# Patient Record
Sex: FEMALE | Race: BLACK OR AFRICAN AMERICAN | NOT HISPANIC OR LATINO | Employment: FULL TIME | URBAN - METROPOLITAN AREA
[De-identification: names, ages, dates, MRNs, and addresses within clinical notes are randomized per-mention and may not be internally consistent; named-entity substitution may affect disease eponyms.]

---

## 2018-09-21 LAB — EXTERNAL URINE DRUG SCREEN: NORMAL

## 2018-09-22 LAB
EXTERNAL ABO GROUPING: (no result)
EXTERNAL ANTIBODY SCREEN: NORMAL
EXTERNAL HEMOGLOBIN: 13.6 G/DL
EXTERNAL HEPATITIS B SURFACE ANTIGEN: NEGATIVE
EXTERNAL PLATELET COUNT: 256 K/ΜL
EXTERNAL RH FACTOR: POSITIVE
EXTERNAL SYPHILIS RPR SCREEN: (no result)
HIV 1+2 AB+HIV1 P24 AG SERPL QL IA: NON REACTIVE
RUBV IGG SERPL IA-ACNC: (no result)

## 2018-11-28 LAB
EXTERNAL GLUCOSE TOLERANCE TEST FASTING: 89 MG/DL
EXTERNAL HEMOGLOBIN: 12.6 G/DL
EXTERNAL PLATELET COUNT: 229 K/ΜL
EXTERNAL PLATELET COUNT: 289 K/ΜL

## 2018-12-07 LAB
C TRACH RRNA SPEC DONR QL NAA+PROBE: NORMAL
N GONORRHOEA DNA SPEC QL NAA+PROBE: NORMAL

## 2019-01-23 ENCOUNTER — APPOINTMENT (OUTPATIENT)
Dept: LAB | Facility: HOSPITAL | Age: 29
End: 2019-01-23

## 2019-01-23 ENCOUNTER — INITIAL PRENATAL (OUTPATIENT)
Dept: OBSTETRICS AND GYNECOLOGY | Facility: CLINIC | Age: 29
End: 2019-01-23

## 2019-01-23 VITALS — BODY MASS INDEX: 36.4 KG/M2 | SYSTOLIC BLOOD PRESSURE: 132 MMHG | WEIGHT: 199 LBS | DIASTOLIC BLOOD PRESSURE: 72 MMHG

## 2019-01-23 DIAGNOSIS — Z34.82 PRENATAL CARE, SUBSEQUENT PREGNANCY IN SECOND TRIMESTER: ICD-10-CM

## 2019-01-23 DIAGNOSIS — O23.42 URINARY TRACT INFECTION IN MOTHER DURING SECOND TRIMESTER OF PREGNANCY: Primary | ICD-10-CM

## 2019-01-23 PROBLEM — Z34.80 PRENATAL CARE, SUBSEQUENT PREGNANCY: Status: ACTIVE | Noted: 2019-01-23

## 2019-01-23 PROBLEM — O23.40 UTI IN PREGNANCY: Status: ACTIVE | Noted: 2019-01-23

## 2019-01-23 PROCEDURE — 87086 URINE CULTURE/COLONY COUNT: CPT | Performed by: OBSTETRICS & GYNECOLOGY

## 2019-01-23 PROCEDURE — 87077 CULTURE AEROBIC IDENTIFY: CPT | Performed by: OBSTETRICS & GYNECOLOGY

## 2019-01-23 PROCEDURE — 87186 SC STD MICRODIL/AGAR DIL: CPT | Performed by: OBSTETRICS & GYNECOLOGY

## 2019-01-23 PROCEDURE — 0501F PRENATAL FLOW SHEET: CPT | Performed by: OBSTETRICS & GYNECOLOGY

## 2019-01-23 NOTE — PROGRESS NOTES
Subjective   Chief Complaint   Patient presents with   • Initial Prenatal Visit     Transfer of care from Dr Nieto at [facility]       Kym Mckenna is a 28 y.o. year old .  No LMP recorded. Patient is pregnant.  She presents to be seen to as a transfer of care from [facility]. She reports she transferred care because she thinks they mismanaged her UTI in December. She reports being in the hospital and receiving IV abx. No urinary symptoms today. Has been having intermittent cramping. Reports FM.     Last saw [facility] in   Reports she has had OB labs   Reports she had normal genetic screening    Social History    Tobacco Use      Smoking status: Never Smoker      The following portions of the patient's history were reviewed and updated as appropriate:vital signs, allergies, current medications, past medical history, past social history, past surgical history and problem list.    Objective   Lab Review   No data reviewed    Imaging   No data reviewed     Pelvic: c/th/hi    Assessment/Plan   ASSESSMENT  1. 28 y.o. year old  at 28w5d  2. Supervision of high risk pregnancy   3. UTI in pregnancy - s/p treatment     PLAN  1. The problem list for pregnancy was initiated today  2. Tests ordered today:  Orders Placed This Encounter   Procedures   • Urine Culture - Urine, Urine, Clean Catch   • Urine Drug Screen - Urine, Clean Catch     Please confirm all positive UDS   • Glucose, Post 50 Gm Glucola     Standing Status:   Future     Standing Expiration Date:   3/24/2019   • Hemoglobin & Hematocrit, Blood     Standing Status:   Future     Standing Expiration Date:   2020     3. Testing for GC / Chlamydia / trichomonas was recently done and will not need to be repeated  4. Genetic testing reviewed: reports was normal   5. Information reviewed: exercise in pregnancy, nutrition in pregnancy, weight gain in pregnancy, work and travel restrictions during pregnancy, list of OTC medications acceptable in pregnancy  and call coverage groups   6. The following data needs to be obtained to update her medical records: all prenatal records asap.    Total time spent today with Kym  was 50 minutes (level 4).  Off this time, > 50% was spent face-to-face time coordinating care, answering her questions and counseling regarding pathophysiology of her presenting problem along with plans for any diagnositc work-up and treatment.    Follow up: 1 week(s)  For glucola and labs and close follow up to make sure records make it.        This note was electronically signed.    Mine Hussein MD  January 23, 2019

## 2019-01-25 ENCOUNTER — DOCUMENTATION (OUTPATIENT)
Dept: OBSTETRICS AND GYNECOLOGY | Facility: CLINIC | Age: 29
End: 2019-01-25

## 2019-01-25 ENCOUNTER — TELEPHONE (OUTPATIENT)
Dept: OBSTETRICS AND GYNECOLOGY | Facility: CLINIC | Age: 29
End: 2019-01-25

## 2019-01-25 PROBLEM — Q21.0 VSD (VENTRICULAR SEPTAL DEFECT AND AORTIC ARCH HYPOPLASIA: Status: ACTIVE | Noted: 2019-01-25

## 2019-01-25 PROBLEM — Q25.42 VSD (VENTRICULAR SEPTAL DEFECT AND AORTIC ARCH HYPOPLASIA: Status: ACTIVE | Noted: 2019-01-25

## 2019-01-25 LAB — BACTERIA SPEC AEROBE CULT: ABNORMAL

## 2019-01-25 RX ORDER — NITROFURANTOIN 25; 75 MG/1; MG/1
100 CAPSULE ORAL 2 TIMES DAILY
Qty: 10 CAPSULE | Refills: 0 | Status: SHIPPED | OUTPATIENT
Start: 2019-01-25 | End: 2019-01-30

## 2019-01-25 RX ORDER — NITROFURANTOIN 25; 75 MG/1; MG/1
100 CAPSULE ORAL DAILY
Qty: 30 CAPSULE | Refills: 3 | Status: SHIPPED | OUTPATIENT
Start: 2019-01-25 | End: 2019-02-05 | Stop reason: SDUPTHER

## 2019-01-25 NOTE — TELEPHONE ENCOUNTER
Patient's urine test from first prenatal visit was positive for infection/UTI. Macrobid 100mg BID sent to pharmacy for treatment duration of 5 days. Given this is her second UTI she needs to be on prophylaxis the remainder of pregnancy. Additional rx for macrobid 100mg daily sent to pharmacy as well. Can be patient be notified? Thanks, Mine Hussein MD

## 2019-01-25 NOTE — TELEPHONE ENCOUNTER
Dr. Hussein patient 29w0d  [phone] called patient regarding her urine culture. Recording: We're sorry but your call cannot be completed at this time please hang up and try your call again later.

## 2019-01-29 ENCOUNTER — TELEPHONE (OUTPATIENT)
Dept: OBSTETRICS AND GYNECOLOGY | Facility: CLINIC | Age: 29
End: 2019-01-29

## 2019-02-01 ENCOUNTER — DOCUMENTATION (OUTPATIENT)
Dept: OBSTETRICS AND GYNECOLOGY | Facility: CLINIC | Age: 29
End: 2019-02-01

## 2019-02-05 ENCOUNTER — LAB (OUTPATIENT)
Dept: LAB | Facility: HOSPITAL | Age: 29
End: 2019-02-05

## 2019-02-05 ENCOUNTER — HOSPITAL ENCOUNTER (OUTPATIENT)
Dept: WOMENS IMAGING | Facility: HOSPITAL | Age: 29
Discharge: HOME | End: 2019-02-05
Admitting: OBSTETRICS & GYNECOLOGY

## 2019-02-05 ENCOUNTER — OFFICE VISIT (OUTPATIENT)
Dept: OBSTETRICS AND GYNECOLOGY | Facility: HOSPITAL | Age: 29
End: 2019-02-05

## 2019-02-05 ENCOUNTER — ROUTINE PRENATAL (OUTPATIENT)
Dept: OBSTETRICS AND GYNECOLOGY | Facility: CLINIC | Age: 29
End: 2019-02-05

## 2019-02-05 VITALS
WEIGHT: 201 LBS | BODY MASS INDEX: 36.99 KG/M2 | HEIGHT: 62 IN | DIASTOLIC BLOOD PRESSURE: 61 MMHG | SYSTOLIC BLOOD PRESSURE: 115 MMHG

## 2019-02-05 VITALS — BODY MASS INDEX: 36.58 KG/M2 | WEIGHT: 200 LBS | DIASTOLIC BLOOD PRESSURE: 80 MMHG | SYSTOLIC BLOOD PRESSURE: 128 MMHG

## 2019-02-05 DIAGNOSIS — Z34.82 PRENATAL CARE, SUBSEQUENT PREGNANCY IN SECOND TRIMESTER: ICD-10-CM

## 2019-02-05 DIAGNOSIS — O35.BXX0 ANOMALY OF HEART OF FETUS AFFECTING PREGNANCY, ANTEPARTUM, SINGLE OR UNSPECIFIED FETUS: Primary | ICD-10-CM

## 2019-02-05 DIAGNOSIS — Q25.42 VSD (VENTRICULAR SEPTAL DEFECT AND AORTIC ARCH HYPOPLASIA: ICD-10-CM

## 2019-02-05 DIAGNOSIS — Q21.0 VSD (VENTRICULAR SEPTAL DEFECT AND AORTIC ARCH HYPOPLASIA: ICD-10-CM

## 2019-02-05 DIAGNOSIS — Z34.90 PREGNANCY, UNSPECIFIED GESTATIONAL AGE: ICD-10-CM

## 2019-02-05 PROBLEM — Z87.410 HISTORY OF CERVICAL DYSPLASIA: Status: ACTIVE | Noted: 2019-02-05

## 2019-02-05 PROBLEM — Z30.09 STERILIZATION CONSULT: Status: ACTIVE | Noted: 2019-02-05

## 2019-02-05 LAB
GLUCOSE 1H P 100 G GLC PO SERPL-MCNC: 106 MG/DL (ref 65–140)
HCT VFR BLD AUTO: 35.8 % (ref 34.5–44)
HGB BLD-MCNC: 11.8 G/DL (ref 11.5–15.5)

## 2019-02-05 PROCEDURE — 0502F SUBSEQUENT PRENATAL CARE: CPT | Performed by: OBSTETRICS & GYNECOLOGY

## 2019-02-05 PROCEDURE — 76811 OB US DETAILED SNGL FETUS: CPT

## 2019-02-05 PROCEDURE — 82950 GLUCOSE TEST: CPT

## 2019-02-05 PROCEDURE — 85018 HEMOGLOBIN: CPT

## 2019-02-05 PROCEDURE — 76811 OB US DETAILED SNGL FETUS: CPT | Performed by: OBSTETRICS & GYNECOLOGY

## 2019-02-05 PROCEDURE — 36415 COLL VENOUS BLD VENIPUNCTURE: CPT

## 2019-02-05 PROCEDURE — 85014 HEMATOCRIT: CPT

## 2019-02-05 RX ORDER — NITROFURANTOIN 25; 75 MG/1; MG/1
100 CAPSULE ORAL DAILY
Qty: 30 CAPSULE | Refills: 3 | Status: SHIPPED | OUTPATIENT
Start: 2019-02-05 | End: 2019-02-26 | Stop reason: HOSPADM

## 2019-02-05 RX ORDER — PRENATAL WITH FERROUS FUM AND FOLIC ACID 3080; 920; 120; 400; 22; 1.84; 3; 20; 10; 1; 12; 200; 27; 25; 2 [IU]/1; [IU]/1; MG/1; [IU]/1; MG/1; MG/1; MG/1; MG/1; MG/1; MG/1; UG/1; MG/1; MG/1; MG/1; MG/1
1 TABLET ORAL DAILY
COMMUNITY
End: 2021-08-24

## 2019-02-05 NOTE — PROGRESS NOTES
Chief Complaint   Patient presents with   • Routine Prenatal Visit       HPI: Kym is a  currently at 30w4d who today reports the following:  Nausea - No; Vaginal bleeding -  No; Heartburn - No.    ROS:  GI: Constipation - No; Diarrhea - No    Neuro: Headache - No; Visual change - No      EXAM:  Vitals: See prenatal flowsheet   Abdomen: See prenatal flowsheet   Urine glucose/protein: See prenatal flowsheet   Pelvic: See prenatal flowsheet     Prenatal Labs  Lab Results   Component Value Date    HGB 12.6 2018    RUBELLAABIGG immune 2018    HEPBSAG Negative 2018    ABSCRN Normal 2018    SOD9UXC2 non reactive  2018    URINECX 50,000-60,000 CFU/mL Citrobacter koseri (A) 2019    CHLAMNAA neg 2018    NGONORRHON neg 2018       MDM:  Impression: 1. Supervision of high risk pregnancy  2. History of UTI this pregnancy x2 - on suppression  3. History of small fetal VSD with previous provider ultrasound   4. Desires permanent sterilization    Tests done today: 1. GCT  2. HgB   Topics discussed: 1. Continue with PNV's  2. Prenatal labs reviewed  3. Sign medicaid papers for tubal    Tests scheduled today for her next visit:   PDC scan for follow up of VSD

## 2019-02-05 NOTE — PROGRESS NOTES
Denies problems. Saw Dr. Hussein today, next visit 2/20/2019. Transferred care from Dr. Nieto to Dr. Hussein 1/23/2019.

## 2019-02-11 ENCOUNTER — TELEPHONE (OUTPATIENT)
Dept: OBSTETRICS AND GYNECOLOGY | Facility: CLINIC | Age: 29
End: 2019-02-11

## 2019-02-11 NOTE — TELEPHONE ENCOUNTER
If she wants a 39+ week elective IOL I would prefer the morning of Monday April 8th if possible because I am on call that evening.   Thanks  Mine Hussein MD

## 2019-02-11 NOTE — TELEPHONE ENCOUNTER
She has had a prior vaginal delivery so we would not be doing a c section. She will either have a tubal done prior to discharge or at 6 weeks laparoscopically as we discussed at her last visit. Plan is for vaginal delivery.     Mine Hussein MD

## 2019-02-20 ENCOUNTER — ROUTINE PRENATAL (OUTPATIENT)
Dept: OBSTETRICS AND GYNECOLOGY | Facility: CLINIC | Age: 29
End: 2019-02-20

## 2019-02-20 ENCOUNTER — APPOINTMENT (OUTPATIENT)
Dept: LAB | Facility: HOSPITAL | Age: 29
End: 2019-02-20

## 2019-02-20 VITALS — DIASTOLIC BLOOD PRESSURE: 62 MMHG | SYSTOLIC BLOOD PRESSURE: 114 MMHG | BODY MASS INDEX: 37.74 KG/M2 | WEIGHT: 203 LBS

## 2019-02-20 DIAGNOSIS — Z30.09 STERILIZATION CONSULT: ICD-10-CM

## 2019-02-20 DIAGNOSIS — O35.BXX0 ANOMALY OF HEART OF FETUS AFFECTING PREGNANCY, ANTEPARTUM, SINGLE OR UNSPECIFIED FETUS: ICD-10-CM

## 2019-02-20 DIAGNOSIS — Z34.83 PRENATAL CARE, SUBSEQUENT PREGNANCY IN THIRD TRIMESTER: ICD-10-CM

## 2019-02-20 DIAGNOSIS — O23.43 URINARY TRACT INFECTION IN MOTHER DURING THIRD TRIMESTER OF PREGNANCY: ICD-10-CM

## 2019-02-20 PROCEDURE — 87086 URINE CULTURE/COLONY COUNT: CPT | Performed by: OBSTETRICS & GYNECOLOGY

## 2019-02-20 PROCEDURE — 0502F SUBSEQUENT PRENATAL CARE: CPT | Performed by: OBSTETRICS & GYNECOLOGY

## 2019-02-20 NOTE — PROGRESS NOTES
Chief Complaint   Patient presents with   • Routine Prenatal Visit     ob visit, c/o's abd and pelvic pain and pressure       HPI: Kym is a  currently at 32w5d who today reports the following:  Contractions - YES - but less than 4/hour AND no associated change in vaginal discharge; Leaking - No; Vaginal bleeding -  No; Heartburn - No.  She has a lot of pressure and stinging in the vaginal area and feels pain with urination     ROS:  GI: Nausea - No ; Constipation - No; Diarrhea - No    Neuro: Headache - No ; Visual change - No      EXAM:  Vitals: See prenatal flowsheet   Abdomen: See prenatal flowsheet   Urine glucose/protein: See prenatal flowsheet   Pelvic: See prenatal flowsheet - ft to closed/th/hi, vertex     Lab Results   Component Value Date    HGB 11.8 2019    HCT 35.8 2019     2019    ABO A 2018    RH Positive 2018    ABSCRN Normal 2018       MDM:  Impression: 1. Supervision of high risk pregnancy   2. History of UTI this pregnancy x2 - on suppression with macrobid daily   3. History of small fetal VSD with previous provider ultrasound   4. Desires permanent sterilization    Tests done today: 1. Urine culture ISAAC   Topics discussed: 1. Continue with PNV's  2. Prenatal labs reviewed  3.  labor signs and symptoms   4. Recommended using pregnancy belt    Tests scheduled today for her next visit:   none

## 2019-02-22 LAB — BACTERIA SPEC AEROBE CULT: NORMAL

## 2019-02-26 ENCOUNTER — TELEPHONE (OUTPATIENT)
Dept: OBSTETRICS AND GYNECOLOGY | Facility: CLINIC | Age: 29
End: 2019-02-26

## 2019-02-26 ENCOUNTER — HOSPITAL ENCOUNTER (OUTPATIENT)
Facility: HOSPITAL | Age: 29
Setting detail: OBSERVATION
Discharge: HOME | End: 2019-02-26
Attending: OBSTETRICS & GYNECOLOGY | Admitting: OBSTETRICS & GYNECOLOGY

## 2019-02-26 VITALS
DIASTOLIC BLOOD PRESSURE: 68 MMHG | SYSTOLIC BLOOD PRESSURE: 111 MMHG | TEMPERATURE: 97.6 F | HEART RATE: 92 BPM | HEIGHT: 62 IN | BODY MASS INDEX: 37.36 KG/M2 | WEIGHT: 203 LBS

## 2019-02-26 PROBLEM — R51.9 HA (HEADACHE): Status: ACTIVE | Noted: 2019-02-26

## 2019-02-26 LAB
ALP SERPL-CCNC: 164 U/L (ref 25–100)
ALT SERPL W P-5'-P-CCNC: 9 U/L (ref 7–40)
AST SERPL-CCNC: 16 U/L (ref 0–33)
BACTERIA UR QL AUTO: ABNORMAL /HPF
BILIRUB SERPL-MCNC: 0.4 MG/DL (ref 0.3–1.2)
BILIRUB UR QL STRIP: NEGATIVE
CLARITY UR: ABNORMAL
COLOR UR: YELLOW
CREAT BLD-MCNC: 0.47 MG/DL (ref 0.6–1.3)
DEPRECATED RDW RBC AUTO: 44.1 FL (ref 37–54)
ERYTHROCYTE [DISTWIDTH] IN BLOOD BY AUTOMATED COUNT: 13.9 % (ref 11.3–14.5)
GLUCOSE BLDC GLUCOMTR-MCNC: 90 MG/DL (ref 70–130)
GLUCOSE UR STRIP-MCNC: NEGATIVE MG/DL
HCT VFR BLD AUTO: 36.8 % (ref 34.5–44)
HGB BLD-MCNC: 11.9 G/DL (ref 11.5–15.5)
HGB UR QL STRIP.AUTO: NEGATIVE
HYALINE CASTS UR QL AUTO: ABNORMAL /LPF
KETONES UR QL STRIP: ABNORMAL
LDH SERPL-CCNC: 151 U/L (ref 120–246)
LEUKOCYTE ESTERASE UR QL STRIP.AUTO: ABNORMAL
MCH RBC QN AUTO: 28.5 PG (ref 27–31)
MCHC RBC AUTO-ENTMCNC: 32.3 G/DL (ref 32–36)
MCV RBC AUTO: 88 FL (ref 80–99)
MUCOUS THREADS URNS QL MICRO: ABNORMAL /HPF
NITRITE UR QL STRIP: NEGATIVE
PH UR STRIP.AUTO: 6.5 [PH] (ref 5–8)
PLATELET # BLD AUTO: 163 10*3/MM3 (ref 150–450)
PMV BLD AUTO: 11.8 FL (ref 6–12)
PROT UR QL STRIP: NEGATIVE
RBC # BLD AUTO: 4.18 10*6/MM3 (ref 3.89–5.14)
RBC # UR: ABNORMAL /HPF
REF LAB TEST METHOD: ABNORMAL
SP GR UR STRIP: 1.01 (ref 1–1.03)
SQUAMOUS #/AREA URNS HPF: ABNORMAL /HPF
URATE SERPL-MCNC: 3.6 MG/DL (ref 3.1–7.8)
UROBILINOGEN UR QL STRIP: ABNORMAL
WBC NRBC COR # BLD: 7.96 10*3/MM3 (ref 3.5–10.8)
WBC UR QL AUTO: ABNORMAL /HPF

## 2019-02-26 PROCEDURE — G0378 HOSPITAL OBSERVATION PER HR: HCPCS

## 2019-02-26 PROCEDURE — 81001 URINALYSIS AUTO W/SCOPE: CPT | Performed by: OBSTETRICS & GYNECOLOGY

## 2019-02-26 PROCEDURE — 82962 GLUCOSE BLOOD TEST: CPT

## 2019-02-26 PROCEDURE — 25010000002 PROMETHAZINE PER 50 MG: Performed by: OBSTETRICS & GYNECOLOGY

## 2019-02-26 PROCEDURE — 59025 FETAL NON-STRESS TEST: CPT | Performed by: OBSTETRICS & GYNECOLOGY

## 2019-02-26 PROCEDURE — 96374 THER/PROPH/DIAG INJ IV PUSH: CPT

## 2019-02-26 PROCEDURE — 59025 FETAL NON-STRESS TEST: CPT

## 2019-02-26 PROCEDURE — 99219 PR INITIAL OBSERVATION CARE/DAY 50 MINUTES: CPT | Performed by: OBSTETRICS & GYNECOLOGY

## 2019-02-26 PROCEDURE — 84450 TRANSFERASE (AST) (SGOT): CPT | Performed by: OBSTETRICS & GYNECOLOGY

## 2019-02-26 PROCEDURE — 84075 ASSAY ALKALINE PHOSPHATASE: CPT | Performed by: OBSTETRICS & GYNECOLOGY

## 2019-02-26 PROCEDURE — 82247 BILIRUBIN TOTAL: CPT | Performed by: OBSTETRICS & GYNECOLOGY

## 2019-02-26 PROCEDURE — 85027 COMPLETE CBC AUTOMATED: CPT | Performed by: OBSTETRICS & GYNECOLOGY

## 2019-02-26 PROCEDURE — 84550 ASSAY OF BLOOD/URIC ACID: CPT | Performed by: OBSTETRICS & GYNECOLOGY

## 2019-02-26 PROCEDURE — 96361 HYDRATE IV INFUSION ADD-ON: CPT

## 2019-02-26 PROCEDURE — 84460 ALANINE AMINO (ALT) (SGPT): CPT | Performed by: OBSTETRICS & GYNECOLOGY

## 2019-02-26 PROCEDURE — 82565 ASSAY OF CREATININE: CPT | Performed by: OBSTETRICS & GYNECOLOGY

## 2019-02-26 PROCEDURE — 83615 LACTATE (LD) (LDH) ENZYME: CPT | Performed by: OBSTETRICS & GYNECOLOGY

## 2019-02-26 RX ORDER — OXYCODONE AND ACETAMINOPHEN 10; 325 MG/1; MG/1
1 TABLET ORAL ONCE AS NEEDED
Status: COMPLETED | OUTPATIENT
Start: 2019-02-26 | End: 2019-02-26

## 2019-02-26 RX ORDER — SODIUM CHLORIDE 0.9 % (FLUSH) 0.9 %
3 SYRINGE (ML) INJECTION EVERY 12 HOURS SCHEDULED
Status: DISCONTINUED | OUTPATIENT
Start: 2019-02-26 | End: 2019-02-26 | Stop reason: HOSPADM

## 2019-02-26 RX ORDER — SODIUM CHLORIDE, SODIUM LACTATE, POTASSIUM CHLORIDE, CALCIUM CHLORIDE 600; 310; 30; 20 MG/100ML; MG/100ML; MG/100ML; MG/100ML
1000 INJECTION, SOLUTION INTRAVENOUS CONTINUOUS
Status: DISCONTINUED | OUTPATIENT
Start: 2019-02-26 | End: 2019-02-26 | Stop reason: HOSPADM

## 2019-02-26 RX ORDER — PROMETHAZINE HYDROCHLORIDE 25 MG/ML
12.5 INJECTION, SOLUTION INTRAMUSCULAR; INTRAVENOUS EVERY 6 HOURS PRN
Status: DISCONTINUED | OUTPATIENT
Start: 2019-02-26 | End: 2019-02-26 | Stop reason: HOSPADM

## 2019-02-26 RX ORDER — SODIUM CHLORIDE 0.9 % (FLUSH) 0.9 %
1-10 SYRINGE (ML) INJECTION AS NEEDED
Status: DISCONTINUED | OUTPATIENT
Start: 2019-02-26 | End: 2019-02-26 | Stop reason: HOSPADM

## 2019-02-26 RX ADMIN — SODIUM CHLORIDE, POTASSIUM CHLORIDE, SODIUM LACTATE AND CALCIUM CHLORIDE 1000 ML/HR: 600; 310; 30; 20 INJECTION, SOLUTION INTRAVENOUS at 13:44

## 2019-02-26 RX ADMIN — OXYCODONE HYDROCHLORIDE AND ACETAMINOPHEN 1 TABLET: 10; 325 TABLET ORAL at 14:08

## 2019-02-26 RX ADMIN — PROMETHAZINE HYDROCHLORIDE 12.5 MG: 25 INJECTION INTRAMUSCULAR; INTRAVENOUS at 13:56

## 2019-02-26 NOTE — TELEPHONE ENCOUNTER
franky    Ob pt 33wks having extremely bad migaines for last few days. She has been taking tylenol but it's not helping. Could you call something stronger in for her?     ming in [facility].

## 2019-03-06 ENCOUNTER — ROUTINE PRENATAL (OUTPATIENT)
Dept: OBSTETRICS AND GYNECOLOGY | Facility: CLINIC | Age: 29
End: 2019-03-06

## 2019-03-06 VITALS — BODY MASS INDEX: 37.28 KG/M2 | WEIGHT: 203.8 LBS | SYSTOLIC BLOOD PRESSURE: 120 MMHG | DIASTOLIC BLOOD PRESSURE: 76 MMHG

## 2019-03-06 DIAGNOSIS — O09.93 HIGH-RISK PREGNANCY IN THIRD TRIMESTER: Primary | ICD-10-CM

## 2019-03-06 LAB — GP B STREP RRNA SPEC QL PROBE: NORMAL

## 2019-03-06 PROCEDURE — 0502F SUBSEQUENT PRENATAL CARE: CPT | Performed by: OBSTETRICS & GYNECOLOGY

## 2019-03-06 RX ORDER — NITROFURANTOIN 25; 75 MG/1; MG/1
100 CAPSULE ORAL 2 TIMES DAILY
COMMUNITY
End: 2019-03-19 | Stop reason: ALTCHOICE

## 2019-03-06 NOTE — PROGRESS NOTES
Chief Complaint   Patient presents with   • Routine Prenatal Visit     Garner Proctor contractions every hour       HPI: Kym is a  currently at 34w5d who today reports the following:  Contractions - YES - but less than 4/hour AND no associated change in vaginal discharge; Leaking - No; Vaginal bleeding -  No; Swelling of extremities - No.    ROS:  GI: Nausea - No; Constipation - No; Diarrhea - No    Neuro: Headache - No; Visual change - No      EXAM:  Vitals: See prenatal flowsheet   Abdomen: See prenatal flowsheet   Urine glucose/protein: See prenatal flowsheet   Pelvic: See prenatal flowsheet   MDM:   Impression: 1. Supervision of high risk pregnancy   2. History of UTI this pregnancy x2 - on suppression with macrobid daily. Will need to switch to keflex at next visit.   3. History of small fetal VSD with previous provider ultrasound - none seen on PDC scan   4. Desires permanent sterilization  5. No evidence of PTL today    Tests done today: 1. GBS testing   Topics discussed: 1. Continue with PNV's  2. Prenatal labs reviewed  3. labor signs and symptoms   Tests scheduled today for her next visit:   none. RV in 2 weeks

## 2019-03-11 ENCOUNTER — DOCUMENTATION (OUTPATIENT)
Dept: OBSTETRICS AND GYNECOLOGY | Facility: CLINIC | Age: 29
End: 2019-03-11

## 2019-03-19 ENCOUNTER — ROUTINE PRENATAL (OUTPATIENT)
Dept: OBSTETRICS AND GYNECOLOGY | Facility: CLINIC | Age: 29
End: 2019-03-19

## 2019-03-19 VITALS — BODY MASS INDEX: 37.24 KG/M2 | SYSTOLIC BLOOD PRESSURE: 126 MMHG | DIASTOLIC BLOOD PRESSURE: 70 MMHG | WEIGHT: 203.6 LBS

## 2019-03-19 DIAGNOSIS — Z34.83 PRENATAL CARE, SUBSEQUENT PREGNANCY IN THIRD TRIMESTER: Primary | ICD-10-CM

## 2019-03-19 PROCEDURE — 0502F SUBSEQUENT PRENATAL CARE: CPT | Performed by: OBSTETRICS & GYNECOLOGY

## 2019-03-19 RX ORDER — CEPHALEXIN 500 MG/1
500 CAPSULE ORAL DAILY
Qty: 30 CAPSULE | Refills: 0 | Status: SHIPPED | OUTPATIENT
Start: 2019-03-19 | End: 2019-04-03 | Stop reason: SDUPTHER

## 2019-03-19 NOTE — PROGRESS NOTES
Chief Complaint   Patient presents with   • Routine Prenatal Visit     pt states that she is still having agnes brannon contractions, about one every hour.       HPI: Kym is a  currently at 36w4d who today reports the following:  Contractions - YES - but less than 4/hour AND no associated change in vaginal discharge; Leaking - No; Vaginal bleeding -  No; Swelling of extremities - No.    ROS:  GI: Nausea - No; Constipation - No; Diarrhea - No    Neuro: Headache - No; Visual change - No      EXAM:  Vitals: See prenatal flowsheet   Abdomen: See prenatal flowsheet   Urine glucose/protein: See prenatal flowsheet   Pelvic: See prenatal flowsheet - FT, th, high   MDM:   Impression: 1. Supervision of high risk pregnancy   2. History of UTI this pregnancy x2 - on suppression with macrobid daily. Switched to kelfex today   3. History of small fetal VSD with previous provider ultrasound - none seen on PDC scan   4. Desires permanent sterilization   Tests done today: 1. none   Topics discussed: 1. Continue with PNV's  2. Prenatal labs reviewed  3. labor signs and symptoms   4. Reviewed negative GBS from 2 weeks ago.    Tests scheduled today for her next visit:   none. RV in one week

## 2019-03-20 ENCOUNTER — TELEPHONE (OUTPATIENT)
Dept: OBSTETRICS AND GYNECOLOGY | Facility: CLINIC | Age: 29
End: 2019-03-20

## 2019-03-20 NOTE — TELEPHONE ENCOUNTER
Letter made in patients chart if if can be faxed and patient notified.     Thanks,   Mine Hussein MD

## 2019-03-20 NOTE — TELEPHONE ENCOUNTER
I told her I am happy to do whenever now just as long as she knows it may affect her days after delivery. If this is okay with her work then let me know and we can draft a letter.     Mine Hussein MD

## 2019-03-20 NOTE — TELEPHONE ENCOUNTER
Dr Hussein    Pt calling and need note stating she will be off from March 25 until Leta 15, 2019.    Pt call back [phone]

## 2019-03-20 NOTE — TELEPHONE ENCOUNTER
Spoke with pt, she does not need [facility] papers only a letter from Dr Hussein stating she can be off from   March 25-Leta 15. 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

## 2019-03-27 ENCOUNTER — ROUTINE PRENATAL (OUTPATIENT)
Dept: OBSTETRICS AND GYNECOLOGY | Facility: CLINIC | Age: 29
End: 2019-03-27

## 2019-03-27 ENCOUNTER — HOSPITAL ENCOUNTER (OUTPATIENT)
Facility: HOSPITAL | Age: 29
Discharge: HOME | End: 2019-03-27
Attending: OBSTETRICS & GYNECOLOGY | Admitting: OBSTETRICS & GYNECOLOGY

## 2019-03-27 VITALS
SYSTOLIC BLOOD PRESSURE: 125 MMHG | HEART RATE: 102 BPM | WEIGHT: 205 LBS | BODY MASS INDEX: 37.73 KG/M2 | DIASTOLIC BLOOD PRESSURE: 65 MMHG | RESPIRATION RATE: 16 BRPM | HEIGHT: 62 IN | TEMPERATURE: 97.9 F

## 2019-03-27 VITALS — SYSTOLIC BLOOD PRESSURE: 122 MMHG | DIASTOLIC BLOOD PRESSURE: 68 MMHG | WEIGHT: 204.8 LBS | BODY MASS INDEX: 37.46 KG/M2

## 2019-03-27 DIAGNOSIS — Z34.83 PRENATAL CARE, SUBSEQUENT PREGNANCY IN THIRD TRIMESTER: Primary | ICD-10-CM

## 2019-03-27 LAB
ALBUMIN SERPL-MCNC: 3.21 G/DL (ref 3.2–4.8)
ALBUMIN/GLOB SERPL: 1.3 G/DL (ref 1.5–2.5)
ALP SERPL-CCNC: 212 U/L (ref 25–100)
ALT SERPL W P-5'-P-CCNC: 12 U/L (ref 7–40)
ANION GAP SERPL CALCULATED.3IONS-SCNC: 6 MMOL/L (ref 3–11)
AST SERPL-CCNC: 16 U/L (ref 0–33)
BACTERIA UR QL AUTO: NORMAL /HPF
BILIRUB SERPL-MCNC: 0.5 MG/DL (ref 0.3–1.2)
BILIRUB UR QL STRIP: NEGATIVE
BUN BLD-MCNC: <5 MG/DL (ref 9–23)
BUN/CREAT SERPL: ABNORMAL (ref 7–25)
CALCIUM SPEC-SCNC: 8.4 MG/DL (ref 8.7–10.4)
CHLORIDE SERPL-SCNC: 109 MMOL/L (ref 99–109)
CLARITY UR: ABNORMAL
CO2 SERPL-SCNC: 23 MMOL/L (ref 20–31)
COLOR UR: YELLOW
CREAT BLD-MCNC: 0.4 MG/DL (ref 0.6–1.3)
DEPRECATED RDW RBC AUTO: 43.1 FL (ref 37–54)
ERYTHROCYTE [DISTWIDTH] IN BLOOD BY AUTOMATED COUNT: 13.9 % (ref 11.3–14.5)
GFR SERPL CREATININE-BSD FRML MDRD: >150 ML/MIN/1.73
GLOBULIN UR ELPH-MCNC: 2.5 GM/DL
GLUCOSE BLD-MCNC: 72 MG/DL (ref 70–100)
GLUCOSE UR STRIP-MCNC: NEGATIVE MG/DL
HCT VFR BLD AUTO: 33.4 % (ref 34.5–44)
HGB BLD-MCNC: 11 G/DL (ref 11.5–15.5)
HGB UR QL STRIP.AUTO: NEGATIVE
HYALINE CASTS UR QL AUTO: NORMAL /LPF
KETONES UR QL STRIP: NEGATIVE
LEUKOCYTE ESTERASE UR QL STRIP.AUTO: NEGATIVE
MCH RBC QN AUTO: 28.3 PG (ref 27–31)
MCHC RBC AUTO-ENTMCNC: 32.9 G/DL (ref 32–36)
MCV RBC AUTO: 85.9 FL (ref 80–99)
NITRITE UR QL STRIP: NEGATIVE
PH UR STRIP.AUTO: 7 [PH] (ref 5–8)
PLATELET # BLD AUTO: 168 10*3/MM3 (ref 150–450)
PMV BLD AUTO: 10.9 FL (ref 6–12)
POTASSIUM BLD-SCNC: 3.3 MMOL/L (ref 3.5–5.5)
PROT SERPL-MCNC: 5.7 G/DL (ref 5.7–8.2)
PROT UR QL STRIP: NEGATIVE
RBC # BLD AUTO: 3.89 10*6/MM3 (ref 3.89–5.14)
RBC # UR: NORMAL /HPF
REF LAB TEST METHOD: NORMAL
SODIUM BLD-SCNC: 138 MMOL/L (ref 132–146)
SP GR UR STRIP: 1.01 (ref 1–1.03)
SQUAMOUS #/AREA URNS HPF: NORMAL /HPF
UROBILINOGEN UR QL STRIP: ABNORMAL
WBC NRBC COR # BLD: 8.62 10*3/MM3 (ref 3.5–10.8)
WBC UR QL AUTO: NORMAL /HPF

## 2019-03-27 PROCEDURE — 59025 FETAL NON-STRESS TEST: CPT

## 2019-03-27 PROCEDURE — 59025 FETAL NON-STRESS TEST: CPT | Performed by: OBSTETRICS & GYNECOLOGY

## 2019-03-27 PROCEDURE — G0463 HOSPITAL OUTPT CLINIC VISIT: HCPCS

## 2019-03-27 PROCEDURE — 99213 OFFICE O/P EST LOW 20 MIN: CPT | Performed by: OBSTETRICS & GYNECOLOGY

## 2019-03-27 PROCEDURE — 80053 COMPREHEN METABOLIC PANEL: CPT | Performed by: OBSTETRICS & GYNECOLOGY

## 2019-03-27 PROCEDURE — 81001 URINALYSIS AUTO W/SCOPE: CPT | Performed by: OBSTETRICS & GYNECOLOGY

## 2019-03-27 PROCEDURE — 0502F SUBSEQUENT PRENATAL CARE: CPT | Performed by: OBSTETRICS & GYNECOLOGY

## 2019-03-27 PROCEDURE — 85027 COMPLETE CBC AUTOMATED: CPT | Performed by: OBSTETRICS & GYNECOLOGY

## 2019-03-27 RX ORDER — SODIUM CHLORIDE, SODIUM LACTATE, POTASSIUM CHLORIDE, CALCIUM CHLORIDE 600; 310; 30; 20 MG/100ML; MG/100ML; MG/100ML; MG/100ML
1000 INJECTION, SOLUTION INTRAVENOUS ONCE
Status: COMPLETED | OUTPATIENT
Start: 2019-03-27 | End: 2019-03-27

## 2019-03-27 RX ADMIN — SODIUM CHLORIDE, POTASSIUM CHLORIDE, SODIUM LACTATE AND CALCIUM CHLORIDE 1000 ML: 600; 310; 30; 20 INJECTION, SOLUTION INTRAVENOUS at 13:26

## 2019-03-27 NOTE — H&P
[facility]  Obstetric History and Physical    Chief Complaint   Patient presents with   • Non-stress Test       Subjective     Patient is a 28 y.o. female  currently at 37w5d, who presents for nonstress test secondary to prolonged fetal tachycardia noted during prenatal exam at Dr. Hussein's office.  Patient has reported normal fetal movement.  Additionally, the patient has had some recent nausea and vomiting.  She has had a history of prior urinary tract infections and has also been on prolonged suppressive antibiotic therapy.    Her prenatal care is as noted above. Her previous obstetric/gynecological history is noncontributory.    The following portions of the patients history were reviewed and updated as appropriate: current medications, allergies, past medical history, past surgical history, past family history, past social history and problem list .        Prenatal Information:   Maternal Prenatal Labs  Blood Type No results found for: ABO   Rh Status No results found for: RH   Antibody Screen No results found for: ABSCRN   Gonnorhea No results found for: GCCX   Chlamydia No results found for: CLAMYDCU   RPR No results found for: RPR   Syphilis Antibody No results found for: SYPHILIS   Rubella No results found for: RUBELLAIGGIN   Hepatitis B Surface Antigen No results found for: HEPBSAG   HIV-1 Antibody No results found for: LABHIV1   Hepatitis C Antibody No results found for: HEPCAB   Rapid Urin Drug Screen No results found for: AMPMETHU, BARBITSCNUR, LABBENZSCN, LABMETHSCN, LABOPIASCN, THCURSCR, COCAINEUR, AMPHETSCREEN, PROPOXSCN, BUPRENORSCNU, METAMPSCNUR, OXYCODONESCN, TRICYCLICSCN   Group B Strep Culture No results found for: GBSANTIGEN           External Prenatal Results     Pregnancy Outside Results - Transcribed From Office Records - See Scanned Records For Details     Test Value Date Time    Hgb 11.0 g/dL 19 1352    Hct 33.4 % 19 1352    ABO A  18     Rh Positive  18      Antibody Screen Normal  18     Glucose Fasting GTT       Glucose Tolerance Test 1 hour       Glucose Tolerance Test 3 hour       Gonorrhea (discrete) neg  18     Chlamydia (discrete) neg  18     RPR Non-Reactive  18     VDRL       Syphilis Antibody       Rubella immune  18     HBsAg Negative  18     Herpes Simplex Virus PCR       Herpes Simplex VIrus Culture       HIV non reactive   18     Hep C RNA Quant PCR       Hep C Antibody       AFP       Group B Strep neg  19     GBS Susceptibility to Clindamycin       GBS Susceptibility to Erythromycin       Fetal Fibronectin       Genetic Testing, Maternal Blood             Drug Screening     Test Value Date Time    Urine Drug Screen       Amphetamine Screen       Barbiturate Screen       Benzodiazepine Screen       Methadone Screen       Phencyclidine Screen       Opiates Screen       THC Screen       Cocaine Screen       Propoxyphene Screen       Buprenorphine Screen       Methamphetamine Screen       Oxycodone Screen       Tricyclic Antidepressants Screen                     Past OB History:       Obstetric History       T1      L1     SAB1   TAB2   Ectopic0   Molar0   Multiple0   Live Births1       # Outcome Date GA Lbr Mikel/2nd Weight Sex Delivery Anes PTL Lv   5 Current            4 TAB 2009 8w0d    TAB      3 TAB 2009 8w0d    TAB      2 SAB 06 8w0d    SAB      1 Term 05 39w3d  3147 g (6 lb 15 oz) M Vag-Spont EPI N ABIOLA      Complications: Status post induction of labor      Obstetric Comments   G1: Cardale, No GDM/HTN   FOB #1: Pregnancy #1; #3   FOB #2: Pregnancy #2       Past Medical History:  Past Medical History:   Diagnosis Date   • Abnormal Pap smear of cervix 2018    colpo WNL   • Ovarian cyst       Past Surgical History Past Surgical History:   Procedure Laterality Date   • BREAST BIOPSY Left     WNL   • COLPOSCOPY  2018    no biopsies   • COLPOSCOPY  2018    WNL   •  INDUCED   2009    EAB   • INDUCED   2009    EAB      Family History: Family History   Problem Relation Age of Onset   • Diabetes Father    • Hypertension Father    • Hypertension Mother    • Breast cancer Neg Hx    • Ovarian cancer Neg Hx    • Colon cancer Neg Hx       Social History:  reports that she has never smoked. She has never used smokeless tobacco.   reports that she drinks alcohol.   reports that she does not use drugs.   Allergies: Patient has no known allergies.  Current Medications:          No current facility-administered medications on file prior to encounter.      Current Outpatient Medications on File Prior to Encounter   Medication Sig Dispense Refill   • cephalexin (KEFLEX) 500 MG capsule Take 1 capsule by mouth Daily for 30 days. 30 capsule 0   • Prenatal Vit-Fe Fumarate-FA (PRENATAL -) 27-1 MG tablet tablet Take 1 tablet by mouth Daily.         General ROS: Pertinent items are noted in HPI, all other systems reviewed and negative    Objective       Vital Signs Range for the last 24 hours  Temperature: Temp:  [97.9 °F (36.6 °C)-98.2 °F (36.8 °C)] 97.9 °F (36.6 °C)   Temp Source: Temp src: Axillary   BP: BP: (114-125)/(64-68) 125/65   Pulse: Heart Rate:  [] 102   Respirations: Resp:  [16] 16   SPO2:     O2 Amount (l/min):     O2 Devices     Weight: Weight:  [92.9 kg (204 lb 12.8 oz)-93 kg (205 lb)] 93 kg (205 lb)     Physical Examination: General appearance - alert, well appearing, and in no distress, oriented to person, place, and time and overweight  Mental status - alert, oriented to person, place, and time, normal mood, behavior, speech, dress, motor activity, and thought processes  Eyes - pupils equal and reactive, extraocular eye movements intact, sclera anicteric  Mouth - mucous membranes moist, pharynx normal without lesions and dental hygiene good  Neck - supple, no significant adenopathy, thyroid exam: thyroid is normal in size without nodules or  tenderness  Chest - clear to auscultation, no wheezes, rales or rhonchi, symmetric air entry  Heart - normal rate, regular rhythm, normal S1, S2, no murmurs, rubs, clicks or gallops  Abdomen-soft, nontender, nondistended, no masses or organomegaly   Abdomen, Non-Tender  Neurological - alert, oriented, normal speech, no focal findings or movement disorder noted, DTR's normal and symmetric  Extremities - no pedal edema noted    Fetal Heart Rate Assessment  Indication: Fetal tachycardia   Start Time: 1300                end Time: 1740   NST Results: NST reactive.  Baseline fetal heart rate 125-130 bpm.  Average variability.  Multiple 15 x 15 accelerations noted.  No decelerations.  Occasional intermittent contraction noted.        Laboratory Results:   Lab Results   Component Value Date    ALKPHOS 212 (H) 2019    ALT 12 2019    AST 16 2019    CREATININE 0.40 (L) 2019    BILITOT 0.5 2019     2019    URICACID 3.6 2019       WBC   Date Value Ref Range Status   2019 8.62 3.50 - 10.80 10*3/mm3 Final     RBC   Date Value Ref Range Status   2019 3.89 3.89 - 5.14 10*6/mm3 Final     Hemoglobin   Date Value Ref Range Status   2019 11.0 (L) 11.5 - 15.5 g/dL Final     Hematocrit   Date Value Ref Range Status   2019 33.4 (L) 34.5 - 44.0 % Final     MCV   Date Value Ref Range Status   2019 85.9 80.0 - 99.0 fL Final     MCH   Date Value Ref Range Status   2019 28.3 27.0 - 31.0 pg Final     MCHC   Date Value Ref Range Status   2019 32.9 32.0 - 36.0 g/dL Final     RDW   Date Value Ref Range Status   2019 13.9 11.3 - 14.5 % Final     RDW-SD   Date Value Ref Range Status   2019 43.1 37.0 - 54.0 fl Final     MPV   Date Value Ref Range Status   2019 10.9 6.0 - 12.0 fL Final     Platelets   Date Value Ref Range Status   2019 168 150 - 450 10*3/mm3 Final             Brief Urine Lab Results  (Last result in the past 365  "days)      Color   Clarity   Blood   Leuk Est   Nitrite   Protein   CREAT   Urine HCG        03/27/19 1324 Yellow Turbid Negative Negative Negative Negative                 Radiology Review: No new studies  Other Studies: As noted above.  Urinalysis is entirely normal.    Assessment/Plan       * No active hospital problems. *        Assessment:  1. Fetal tachycardia in the office.  Fetal well-being established with reactive NST.  2. Patient complaining of URI symptoms.  Suspect viral URI.  Symptomatic relief measures reviewed.    Plan:  1. Discharge to home.  2. Keep regularly scheduled appointment with Dr. Hussein.     Total time spent today with Kym  was 15 minutes (level 2).  Off this time, > 50% was spent face-to-face time coordinating care, answering her questions and counseling regarding pathophysiology of her presenting problem along with plans for any diagnostic work-up and treatment.        Fernando Loco \"Cherelle\" KOLTON Grijalva MD  3/27/2019  5:40 PM    "

## 2019-03-27 NOTE — PROGRESS NOTES
Chief Complaint   Patient presents with   • Routine Prenatal Visit     pt states no c/o       HPI: Kym is a  currently at 37w5d who today reports the following:  Contractions - YES - but less than 4/hour AND no associated change in vaginal discharge; Leaking - No; Vaginal bleeding -  No; Swelling of extremities - No.  Reports having issues with nausea and head cold    ROS:  GI: Nausea - YES; Constipation - No; Diarrhea - No    Neuro: Headache - No; Visual change - No      EXAM:  Vitals: See prenatal flowsheet   Abdomen: See prenatal flowsheet   Urine glucose/protein: See prenatal flowsheet   Pelvic: See prenatal flowsheet - //hi, cephalic    MDM:   Impression: 1. Supervision of high risk pregnancy   1. History of UTI this pregnancy x2 - on suppression with keflex daily   2. History of small fetal VSD with previous provider ultrasound - none seen on PDC scan   3. Desires permanent sterilization  4. Suspect viral URI with dehdyration  5. Fetal tachycardia  6. Size > dates   Tests done today: 1. none   Topics discussed: 1. Continue with PNV's  2. Prenatal labs reviewed  3. to labor hu for further fetal monitoring, IVFs, and UA    Tests scheduled today for her next visit:   U/S for EFW

## 2019-03-30 ENCOUNTER — HOSPITAL ENCOUNTER (OUTPATIENT)
Facility: HOSPITAL | Age: 29
Setting detail: OBSERVATION
Discharge: HOME | End: 2019-03-30
Attending: OBSTETRICS & GYNECOLOGY | Admitting: OBSTETRICS & GYNECOLOGY

## 2019-03-30 VITALS
DIASTOLIC BLOOD PRESSURE: 64 MMHG | RESPIRATION RATE: 16 BRPM | TEMPERATURE: 97.6 F | WEIGHT: 205 LBS | SYSTOLIC BLOOD PRESSURE: 110 MMHG | HEART RATE: 82 BPM | HEIGHT: 62 IN | BODY MASS INDEX: 37.73 KG/M2

## 2019-03-30 PROBLEM — Z37.9 NORMAL LABOR: Status: ACTIVE | Noted: 2019-03-30

## 2019-03-30 LAB
ABO GROUP BLD: NORMAL
BLD GP AB SCN SERPL QL: NEGATIVE
DEPRECATED RDW RBC AUTO: 44.1 FL (ref 37–54)
ERYTHROCYTE [DISTWIDTH] IN BLOOD BY AUTOMATED COUNT: 14.2 % (ref 11.3–14.5)
HCT VFR BLD AUTO: 35.9 % (ref 34.5–44)
HGB BLD-MCNC: 11.6 G/DL (ref 11.5–15.5)
MCH RBC QN AUTO: 27.9 PG (ref 27–31)
MCHC RBC AUTO-ENTMCNC: 32.3 G/DL (ref 32–36)
MCV RBC AUTO: 86.3 FL (ref 80–99)
PLATELET # BLD AUTO: 199 10*3/MM3 (ref 150–450)
PMV BLD AUTO: 11.3 FL (ref 6–12)
RBC # BLD AUTO: 4.16 10*6/MM3 (ref 3.89–5.14)
RH BLD: POSITIVE
T&S EXPIRATION DATE: NORMAL
WBC NRBC COR # BLD: 9.97 10*3/MM3 (ref 3.5–10.8)

## 2019-03-30 PROCEDURE — 59025 FETAL NON-STRESS TEST: CPT | Performed by: OBSTETRICS & GYNECOLOGY

## 2019-03-30 PROCEDURE — 85027 COMPLETE CBC AUTOMATED: CPT | Performed by: OBSTETRICS & GYNECOLOGY

## 2019-03-30 PROCEDURE — 86900 BLOOD TYPING SEROLOGIC ABO: CPT | Performed by: OBSTETRICS & GYNECOLOGY

## 2019-03-30 PROCEDURE — 99218 PR INITIAL OBSERVATION CARE/DAY 30 MINUTES: CPT | Performed by: OBSTETRICS & GYNECOLOGY

## 2019-03-30 PROCEDURE — 86901 BLOOD TYPING SEROLOGIC RH(D): CPT | Performed by: OBSTETRICS & GYNECOLOGY

## 2019-03-30 PROCEDURE — 59025 FETAL NON-STRESS TEST: CPT

## 2019-03-30 PROCEDURE — G0378 HOSPITAL OBSERVATION PER HR: HCPCS

## 2019-03-30 PROCEDURE — 86850 RBC ANTIBODY SCREEN: CPT | Performed by: OBSTETRICS & GYNECOLOGY

## 2019-03-30 RX ORDER — ACETAMINOPHEN 325 MG/1
650 TABLET ORAL EVERY 4 HOURS PRN
Status: DISCONTINUED | OUTPATIENT
Start: 2019-03-30 | End: 2019-03-30 | Stop reason: HOSPADM

## 2019-03-30 RX ORDER — CARBOPROST TROMETHAMINE 250 UG/ML
250 INJECTION, SOLUTION INTRAMUSCULAR AS NEEDED
Status: CANCELLED | OUTPATIENT
Start: 2019-03-30

## 2019-03-30 RX ORDER — OXYTOCIN-SODIUM CHLORIDE 0.9% IV SOLN 30 UNIT/500ML 30-0.9/5 UT/ML-%
650 SOLUTION INTRAVENOUS ONCE
Status: CANCELLED | OUTPATIENT
Start: 2019-03-30 | End: 2019-03-30

## 2019-03-30 RX ORDER — ONDANSETRON 4 MG/1
4 TABLET, FILM COATED ORAL EVERY 6 HOURS PRN
Status: DISCONTINUED | OUTPATIENT
Start: 2019-03-30 | End: 2019-03-30 | Stop reason: HOSPADM

## 2019-03-30 RX ORDER — MISOPROSTOL 200 UG/1
800 TABLET ORAL AS NEEDED
Status: CANCELLED | OUTPATIENT
Start: 2019-03-30

## 2019-03-30 RX ORDER — SODIUM CHLORIDE 0.9 % (FLUSH) 0.9 %
3 SYRINGE (ML) INJECTION EVERY 12 HOURS SCHEDULED
Status: DISCONTINUED | OUTPATIENT
Start: 2019-03-30 | End: 2019-03-30 | Stop reason: HOSPADM

## 2019-03-30 RX ORDER — BUTORPHANOL TARTRATE 1 MG/ML
1 INJECTION, SOLUTION INTRAMUSCULAR; INTRAVENOUS
Status: DISCONTINUED | OUTPATIENT
Start: 2019-03-30 | End: 2019-03-30 | Stop reason: HOSPADM

## 2019-03-30 RX ORDER — LIDOCAINE HYDROCHLORIDE 10 MG/ML
5 INJECTION, SOLUTION EPIDURAL; INFILTRATION; INTRACAUDAL; PERINEURAL AS NEEDED
Status: DISCONTINUED | OUTPATIENT
Start: 2019-03-30 | End: 2019-03-30 | Stop reason: HOSPADM

## 2019-03-30 RX ORDER — METHYLERGONOVINE MALEATE 0.2 MG/ML
200 INJECTION INTRAVENOUS ONCE AS NEEDED
Status: CANCELLED | OUTPATIENT
Start: 2019-03-30

## 2019-03-30 RX ORDER — ONDANSETRON 2 MG/ML
4 INJECTION INTRAMUSCULAR; INTRAVENOUS EVERY 6 HOURS PRN
Status: DISCONTINUED | OUTPATIENT
Start: 2019-03-30 | End: 2019-03-30 | Stop reason: HOSPADM

## 2019-03-30 RX ORDER — OXYTOCIN-SODIUM CHLORIDE 0.9% IV SOLN 30 UNIT/500ML 30-0.9/5 UT/ML-%
85 SOLUTION INTRAVENOUS ONCE
Status: CANCELLED | OUTPATIENT
Start: 2019-03-30 | End: 2019-03-30

## 2019-03-30 RX ORDER — MAGNESIUM CARB/ALUMINUM HYDROX 105-160MG
30 TABLET,CHEWABLE ORAL ONCE
Status: DISCONTINUED | OUTPATIENT
Start: 2019-03-30 | End: 2019-03-30 | Stop reason: HOSPADM

## 2019-03-30 RX ORDER — SODIUM CHLORIDE 0.9 % (FLUSH) 0.9 %
3-10 SYRINGE (ML) INJECTION AS NEEDED
Status: DISCONTINUED | OUTPATIENT
Start: 2019-03-30 | End: 2019-03-30 | Stop reason: HOSPADM

## 2019-03-30 RX ORDER — SODIUM CHLORIDE, SODIUM LACTATE, POTASSIUM CHLORIDE, CALCIUM CHLORIDE 600; 310; 30; 20 MG/100ML; MG/100ML; MG/100ML; MG/100ML
125 INJECTION, SOLUTION INTRAVENOUS CONTINUOUS
Status: DISCONTINUED | OUTPATIENT
Start: 2019-03-30 | End: 2019-03-30 | Stop reason: HOSPADM

## 2019-04-03 ENCOUNTER — ROUTINE PRENATAL (OUTPATIENT)
Dept: OBSTETRICS AND GYNECOLOGY | Facility: CLINIC | Age: 29
End: 2019-04-03

## 2019-04-03 VITALS — SYSTOLIC BLOOD PRESSURE: 116 MMHG | DIASTOLIC BLOOD PRESSURE: 62 MMHG | BODY MASS INDEX: 38.01 KG/M2 | WEIGHT: 207.8 LBS

## 2019-04-03 DIAGNOSIS — R30.0 DYSURIA: ICD-10-CM

## 2019-04-03 DIAGNOSIS — Z34.83 PRENATAL CARE, SUBSEQUENT PREGNANCY IN THIRD TRIMESTER: Primary | ICD-10-CM

## 2019-04-03 LAB
BACTERIA UR QL AUTO: ABNORMAL /HPF
BILIRUB UR QL STRIP: NEGATIVE
CLARITY UR: ABNORMAL
COLOR UR: YELLOW
GLUCOSE UR STRIP-MCNC: NEGATIVE MG/DL
HGB UR QL STRIP.AUTO: NEGATIVE
HYALINE CASTS UR QL AUTO: ABNORMAL /LPF
KETONES UR QL STRIP: NEGATIVE
LEUKOCYTE ESTERASE UR QL STRIP.AUTO: ABNORMAL
NITRITE UR QL STRIP: NEGATIVE
PH UR STRIP.AUTO: 7.5 [PH] (ref 5–8)
PROT UR QL STRIP: NEGATIVE
RBC # UR: ABNORMAL /HPF
REF LAB TEST METHOD: ABNORMAL
SP GR UR STRIP: 1.01 (ref 1–1.03)
SQUAMOUS #/AREA URNS HPF: ABNORMAL /HPF
UROBILINOGEN UR QL STRIP: ABNORMAL
WBC UR QL AUTO: ABNORMAL /HPF

## 2019-04-03 PROCEDURE — 59426 ANTEPARTUM CARE ONLY: CPT | Performed by: OBSTETRICS & GYNECOLOGY

## 2019-04-03 PROCEDURE — 87077 CULTURE AEROBIC IDENTIFY: CPT | Performed by: OBSTETRICS & GYNECOLOGY

## 2019-04-03 PROCEDURE — 87186 SC STD MICRODIL/AGAR DIL: CPT | Performed by: OBSTETRICS & GYNECOLOGY

## 2019-04-03 PROCEDURE — 87086 URINE CULTURE/COLONY COUNT: CPT | Performed by: OBSTETRICS & GYNECOLOGY

## 2019-04-03 PROCEDURE — 81001 URINALYSIS AUTO W/SCOPE: CPT | Performed by: OBSTETRICS & GYNECOLOGY

## 2019-04-03 RX ORDER — CEPHALEXIN 500 MG/1
500 CAPSULE ORAL DAILY
Qty: 6 CAPSULE | Refills: 0 | Status: SHIPPED | OUTPATIENT
Start: 2019-04-03 | End: 2019-04-10 | Stop reason: HOSPADM

## 2019-04-03 NOTE — PROGRESS NOTES
Chief Complaint   Patient presents with   • Routine Prenatal Visit     US here today. pt states no c/o       HPI: Kym is a  currently at 38w5d who today reports the following:  Contractions - No; Leaking - No; Vaginal bleeding -  No; Swelling of extremities - No.  Reports continued cramping.   She stopped taking the keflex suppression because she reports it caused constipation and hemorrhoids     ROS:  GI: Nausea - No; Constipation - No; Diarrhea - No    Neuro: Headache - No; Visual change - No      EXAM:  Vitals: See prenatal flowsheet   Abdomen: See prenatal flowsheet   Urine glucose/protein: See prenatal flowsheet   Pelvic: See prenatal flowsheet   MDM:   Impression: 1. Supervision of high risk pregnancy   1. History of UTI this pregnancy x2 - on suppression with keflex daily - refill ordered  2. History of small fetal VSD with previous provider ultrasound - none seen on PDC scan   3. Desires permanent sterilization   Tests done today: 1. U/S for EFW - 3049 grams (27%), cephalic, posterior placenta, normal JOHN   2. UA with reflex to culture if indicated    Topics discussed: 1. Continue with PNV's  2. Prenatal labs reviewed  3. labor signs and symptoms   4. Reviewed importance of taken keflex suppression - reviewed that this medication does not cause constipation and hemorrhoids    Tests scheduled today for her next visit:   none

## 2019-04-05 ENCOUNTER — TELEPHONE (OUTPATIENT)
Dept: OBSTETRICS AND GYNECOLOGY | Facility: CLINIC | Age: 29
End: 2019-04-05

## 2019-04-05 RX ORDER — CEPHALEXIN 500 MG/1
500 CAPSULE ORAL 4 TIMES DAILY
Qty: 20 CAPSULE | Refills: 0 | Status: SHIPPED | OUTPATIENT
Start: 2019-04-05 | End: 2019-04-10 | Stop reason: HOSPADM

## 2019-04-05 NOTE — TELEPHONE ENCOUNTER
UA from visit this week concerning for UTI. Keflex sent to pharmacy for patient to take 4 times daily for 5 days instead of her suppression. Thanks,   Mine Hussein MD

## 2019-04-06 LAB
BACTERIA SPEC AEROBE CULT: ABNORMAL
BACTERIA SPEC AEROBE CULT: ABNORMAL

## 2019-04-06 NOTE — H&P (VIEW-ONLY)
Usman  Kym Mckenna  : 1990  MRN: [id number]  CSN: [id number]    History and Physical    Subjective   Kym Mckenna is a 28 y.o. year old  with an Estimated Date of Delivery: 19 presenting for induction of labor for favorable cervix at term.    Risks of labor induction including prolongation of labor, increased risks for both  section and operative vaginal birth have been discussed at length.     Prenatal care has been with  Dr. Hussein.  It has been complicated by history of frequent UTIs are suppression and currently being treated. .    Obstetric History       T1      L1     SAB1   TAB2   Ectopic0   Molar0   Multiple0   Live Births1       # Outcome Date GA Lbr Mikel/2nd Weight Sex Delivery Anes PTL Lv   5 Current            4 TAB 2009 8w0d    TAB      3 TAB 2009 8w0d    TAB      2 SAB 06 8w0d    SAB      1 Term 05 39w3d  3147 g (6 lb 15 oz) M Vag-Spont EPI N ABIOLA      Complications: Status post induction of labor      Obstetric Comments   G1: Cardale, No GDM/HTN   FOB #1: Pregnancy #1; #3   FOB #2: Pregnancy #2     Past Medical History:   Diagnosis Date   • Abnormal Pap smear of cervix 2018    colpo WNL   • Ovarian cyst      Past Surgical History:   Procedure Laterality Date   • BREAST BIOPSY Left     WNL   • COLPOSCOPY  2018    no biopsies   • COLPOSCOPY  2018    WNL   • INDUCED   2009    EAB   • INDUCED   2009    EAB       Current Outpatient Medications:   •  cephalexin (KEFLEX) 500 MG capsule, Take 1 capsule by mouth Daily for 6 days. For UTI suppression in pregnancy, Disp: 6 capsule, Rfl: 0  •  cephalexin (KEFLEX) 500 MG capsule, Take 1 capsule by mouth 4 (Four) Times a Day for 5 days., Disp: 20 capsule, Rfl: 0  •  Prenatal Vit-Fe Fumarate-FA (PRENATAL ) 27-1 MG tablet tablet, Take 1 tablet by mouth Daily., Disp: , Rfl:     No Known Allergies  Social History    Tobacco Use      Smoking  status: Never Smoker      Smokeless tobacco: Never Used    Review of Systems   Constitutional: Negative for chills and fever.   HENT: Negative for congestion and sore throat.    Respiratory: Negative for shortness of breath and wheezing.    Cardiovascular: Negative for chest pain and palpitations.   Gastrointestinal: Negative for abdominal pain, nausea and vomiting.   Genitourinary: Negative for frequency, vaginal bleeding and vaginal pain.   Musculoskeletal: Negative for back pain and myalgias.   Neurological: Negative for dizziness, light-headedness and headaches.   Psychiatric/Behavioral: Negative for confusion. The patient is not nervous/anxious.          Objective   There were no vitals taken for this visit.    General: well developed; well nourished  no acute distress   Abdomen: soft, non-tender; no masses  no umbilical or inguinal hernias are present  no hepato-splenomegaly   Cervix: At last prenatal visit - 3 cm / 70 % / -2 / soft / middle   Presentation: At last prenatal visit - cephalic     Prenatal Labs  Lab Results   Component Value Date    HGB 11.6 03/30/2019    RUBELLAABIGG immune 09/22/2018    HEPBSAG Negative 09/22/2018    ABSCRN Negative 03/30/2019    GUQ6KGX3 non reactive  09/22/2018     02/05/2019    GGTFASTING 89 11/28/2018    STREPGPB neg 03/06/2019    URINECX 20,000-30,000 CFU/mL Escherichia coli (A) 04/03/2019    URINECX 20,000-30,000 CFU/mL Citrobacter koseri (A) 04/03/2019    CHLAMNAA neg 12/07/2018    NGONORRHON neg 12/07/2018       Current Labs Reviewed   No data reviewed       Assessment   1. IUP with an Estimated Date of Delivery: 4/12/19  2. Induction of labor because of favorable cervix at term  3. Group B strep status: negative  4. History of frequent UTIs currently being treated  5. Desires permanent sterilization      Plan   1. Pitocin induction  2. Amniotomy with descent of presenting part  3. OK for epidural at patient request     Mine Hussein MD

## 2019-04-08 ENCOUNTER — ANESTHESIA EVENT (OUTPATIENT)
Dept: LABOR AND DELIVERY | Facility: HOSPITAL | Age: 29
End: 2019-04-08

## 2019-04-08 ENCOUNTER — ANESTHESIA (OUTPATIENT)
Dept: LABOR AND DELIVERY | Facility: HOSPITAL | Age: 29
End: 2019-04-08

## 2019-04-08 ENCOUNTER — HOSPITAL ENCOUNTER (INPATIENT)
Facility: HOSPITAL | Age: 29
LOS: 2 days | Discharge: HOME | End: 2019-04-10
Attending: OBSTETRICS & GYNECOLOGY | Admitting: OBSTETRICS & GYNECOLOGY

## 2019-04-08 LAB
ABO GROUP BLD: NORMAL
AMPHET+METHAMPHET UR QL: NEGATIVE
AMPHETAMINES UR QL: NEGATIVE
BARBITURATES UR QL SCN: NEGATIVE
BENZODIAZ UR QL SCN: NEGATIVE
BLD GP AB SCN SERPL QL: NEGATIVE
BUPRENORPHINE SERPL-MCNC: NEGATIVE NG/ML
CANNABINOIDS SERPL QL: NEGATIVE
COCAINE UR QL: NEGATIVE
DEPRECATED RDW RBC AUTO: 43.4 FL (ref 37–54)
ERYTHROCYTE [DISTWIDTH] IN BLOOD BY AUTOMATED COUNT: 14.3 % (ref 12.3–15.4)
HCT VFR BLD AUTO: 37.2 % (ref 34–46.6)
HGB BLD-MCNC: 12.3 G/DL (ref 12–15.9)
MCH RBC QN AUTO: 27.7 PG (ref 26.6–33)
MCHC RBC AUTO-ENTMCNC: 33.1 G/DL (ref 31.5–35.7)
MCV RBC AUTO: 83.8 FL (ref 79–97)
METHADONE UR QL SCN: NEGATIVE
OPIATES UR QL: NEGATIVE
OXYCODONE UR QL SCN: NEGATIVE
PCP UR QL SCN: NEGATIVE
PLATELET # BLD AUTO: 222 10*3/MM3 (ref 140–450)
PMV BLD AUTO: 11.4 FL (ref 6–12)
PROPOXYPH UR QL: NEGATIVE
RBC # BLD AUTO: 4.44 10*6/MM3 (ref 3.77–5.28)
RH BLD: POSITIVE
T&S EXPIRATION DATE: NORMAL
TRICYCLICS UR QL SCN: NEGATIVE
WBC NRBC COR # BLD: 10.09 10*3/MM3 (ref 3.4–10.8)

## 2019-04-08 PROCEDURE — 25010000002 ROPIVACAINE PER 1 MG: Performed by: NURSE ANESTHETIST, CERTIFIED REGISTERED

## 2019-04-08 PROCEDURE — 59025 FETAL NON-STRESS TEST: CPT

## 2019-04-08 PROCEDURE — C1755 CATHETER, INTRASPINAL: HCPCS | Performed by: ANESTHESIOLOGY

## 2019-04-08 PROCEDURE — 86850 RBC ANTIBODY SCREEN: CPT | Performed by: OBSTETRICS & GYNECOLOGY

## 2019-04-08 PROCEDURE — 59410 OBSTETRICAL CARE: CPT | Performed by: OBSTETRICS & GYNECOLOGY

## 2019-04-08 PROCEDURE — 85027 COMPLETE CBC AUTOMATED: CPT | Performed by: OBSTETRICS & GYNECOLOGY

## 2019-04-08 PROCEDURE — 86900 BLOOD TYPING SEROLOGIC ABO: CPT | Performed by: OBSTETRICS & GYNECOLOGY

## 2019-04-08 PROCEDURE — 86901 BLOOD TYPING SEROLOGIC RH(D): CPT | Performed by: OBSTETRICS & GYNECOLOGY

## 2019-04-08 PROCEDURE — 25010000002 KETOROLAC TROMETHAMINE PER 15 MG: Performed by: OBSTETRICS & GYNECOLOGY

## 2019-04-08 PROCEDURE — 25010000002 FENTANYL CITRATE (PF) 100 MCG/2ML SOLUTION: Performed by: NURSE ANESTHETIST, CERTIFIED REGISTERED

## 2019-04-08 PROCEDURE — 10907ZC DRAINAGE OF AMNIOTIC FLUID, THERAPEUTIC FROM PRODUCTS OF CONCEPTION, VIA NATURAL OR ARTIFICIAL OPENING: ICD-10-PCS | Performed by: OBSTETRICS & GYNECOLOGY

## 2019-04-08 PROCEDURE — C1755 CATHETER, INTRASPINAL: HCPCS

## 2019-04-08 PROCEDURE — 51702 INSERT TEMP BLADDER CATH: CPT

## 2019-04-08 PROCEDURE — 80306 DRUG TEST PRSMV INSTRMNT: CPT | Performed by: OBSTETRICS & GYNECOLOGY

## 2019-04-08 RX ORDER — DIPHENHYDRAMINE HYDROCHLORIDE 50 MG/ML
12.5 INJECTION INTRAMUSCULAR; INTRAVENOUS EVERY 8 HOURS PRN
Status: DISCONTINUED | OUTPATIENT
Start: 2019-04-08 | End: 2019-04-08 | Stop reason: HOSPADM

## 2019-04-08 RX ORDER — EPHEDRINE SULFATE/0.9% NACL/PF 25 MG/5 ML
5 SYRINGE (ML) INTRAVENOUS
Status: DISCONTINUED | OUTPATIENT
Start: 2019-04-08 | End: 2019-04-08 | Stop reason: HOSPADM

## 2019-04-08 RX ORDER — DOCUSATE SODIUM 100 MG/1
100 CAPSULE, LIQUID FILLED ORAL 2 TIMES DAILY PRN
Status: DISCONTINUED | OUTPATIENT
Start: 2019-04-08 | End: 2019-04-10 | Stop reason: HOSPADM

## 2019-04-08 RX ORDER — MISOPROSTOL 200 UG/1
800 TABLET ORAL ONCE
Status: DISCONTINUED | OUTPATIENT
Start: 2019-04-08 | End: 2019-04-08 | Stop reason: HOSPADM

## 2019-04-08 RX ORDER — PROMETHAZINE HYDROCHLORIDE 12.5 MG/1
12.5 SUPPOSITORY RECTAL EVERY 6 HOURS PRN
Status: DISCONTINUED | OUTPATIENT
Start: 2019-04-08 | End: 2019-04-08 | Stop reason: HOSPADM

## 2019-04-08 RX ORDER — OXYTOCIN-SODIUM CHLORIDE 0.9% IV SOLN 30 UNIT/500ML 30-0.9/5 UT/ML-%
85 SOLUTION INTRAVENOUS ONCE
Status: COMPLETED | OUTPATIENT
Start: 2019-04-08 | End: 2019-04-08

## 2019-04-08 RX ORDER — CARBOPROST TROMETHAMINE 250 UG/ML
250 INJECTION, SOLUTION INTRAMUSCULAR
Status: DISCONTINUED | OUTPATIENT
Start: 2019-04-08 | End: 2019-04-08 | Stop reason: HOSPADM

## 2019-04-08 RX ORDER — IBUPROFEN 600 MG/1
600 TABLET ORAL EVERY 6 HOURS PRN
Status: DISCONTINUED | OUTPATIENT
Start: 2019-04-08 | End: 2019-04-08 | Stop reason: HOSPADM

## 2019-04-08 RX ORDER — IBUPROFEN 600 MG/1
600 TABLET ORAL EVERY 6 HOURS PRN
Status: DISCONTINUED | OUTPATIENT
Start: 2019-04-08 | End: 2019-04-10 | Stop reason: HOSPADM

## 2019-04-08 RX ORDER — DIPHENHYDRAMINE HCL 25 MG
25 CAPSULE ORAL NIGHTLY PRN
Status: DISCONTINUED | OUTPATIENT
Start: 2019-04-08 | End: 2019-04-10 | Stop reason: HOSPADM

## 2019-04-08 RX ORDER — ACETAMINOPHEN 500 MG
1000 TABLET ORAL EVERY 6 HOURS PRN
Status: DISCONTINUED | OUTPATIENT
Start: 2019-04-08 | End: 2019-04-10 | Stop reason: HOSPADM

## 2019-04-08 RX ORDER — FAMOTIDINE 10 MG/ML
20 INJECTION, SOLUTION INTRAVENOUS ONCE AS NEEDED
Status: DISCONTINUED | OUTPATIENT
Start: 2019-04-08 | End: 2019-04-08 | Stop reason: HOSPADM

## 2019-04-08 RX ORDER — SODIUM CHLORIDE, SODIUM LACTATE, POTASSIUM CHLORIDE, CALCIUM CHLORIDE 600; 310; 30; 20 MG/100ML; MG/100ML; MG/100ML; MG/100ML
125 INJECTION, SOLUTION INTRAVENOUS CONTINUOUS
Status: DISCONTINUED | OUTPATIENT
Start: 2019-04-08 | End: 2019-04-08

## 2019-04-08 RX ORDER — PROMETHAZINE HYDROCHLORIDE 12.5 MG/1
12.5 TABLET ORAL EVERY 6 HOURS PRN
Status: DISCONTINUED | OUTPATIENT
Start: 2019-04-08 | End: 2019-04-08 | Stop reason: HOSPADM

## 2019-04-08 RX ORDER — BUTORPHANOL TARTRATE 1 MG/ML
1 INJECTION, SOLUTION INTRAMUSCULAR; INTRAVENOUS
Status: DISCONTINUED | OUTPATIENT
Start: 2019-04-08 | End: 2019-04-08 | Stop reason: HOSPADM

## 2019-04-08 RX ORDER — METHYLERGONOVINE MALEATE 0.2 MG/ML
200 INJECTION INTRAVENOUS
Status: DISCONTINUED | OUTPATIENT
Start: 2019-04-08 | End: 2019-04-08 | Stop reason: HOSPADM

## 2019-04-08 RX ORDER — BISACODYL 10 MG
10 SUPPOSITORY, RECTAL RECTAL DAILY PRN
Status: DISCONTINUED | OUTPATIENT
Start: 2019-04-09 | End: 2019-04-10 | Stop reason: HOSPADM

## 2019-04-08 RX ORDER — ROPIVACAINE HYDROCHLORIDE 2 MG/ML
14 INJECTION, SOLUTION EPIDURAL; INFILTRATION; PERINEURAL CONTINUOUS
Status: DISCONTINUED | OUTPATIENT
Start: 2019-04-08 | End: 2019-04-08

## 2019-04-08 RX ORDER — ACETAMINOPHEN 325 MG/1
650 TABLET ORAL EVERY 4 HOURS PRN
Status: DISCONTINUED | OUTPATIENT
Start: 2019-04-08 | End: 2019-04-10 | Stop reason: HOSPADM

## 2019-04-08 RX ORDER — MAGNESIUM CARB/ALUMINUM HYDROX 105-160MG
30 TABLET,CHEWABLE ORAL ONCE
Status: DISCONTINUED | OUTPATIENT
Start: 2019-04-08 | End: 2019-04-08 | Stop reason: HOSPADM

## 2019-04-08 RX ORDER — LIDOCAINE HYDROCHLORIDE 10 MG/ML
5 INJECTION, SOLUTION EPIDURAL; INFILTRATION; INTRACAUDAL; PERINEURAL AS NEEDED
Status: DISCONTINUED | OUTPATIENT
Start: 2019-04-08 | End: 2019-04-08 | Stop reason: HOSPADM

## 2019-04-08 RX ORDER — FENTANYL CITRATE 50 UG/ML
INJECTION, SOLUTION INTRAMUSCULAR; INTRAVENOUS AS NEEDED
Status: DISCONTINUED | OUTPATIENT
Start: 2019-04-08 | End: 2019-04-08 | Stop reason: SURG

## 2019-04-08 RX ORDER — MORPHINE SULFATE 2 MG/ML
2 INJECTION, SOLUTION INTRAMUSCULAR; INTRAVENOUS
Status: DISCONTINUED | OUTPATIENT
Start: 2019-04-08 | End: 2019-04-08 | Stop reason: HOSPADM

## 2019-04-08 RX ORDER — SODIUM CHLORIDE 0.9 % (FLUSH) 0.9 %
1-10 SYRINGE (ML) INJECTION AS NEEDED
Status: DISCONTINUED | OUTPATIENT
Start: 2019-04-08 | End: 2019-04-10 | Stop reason: HOSPADM

## 2019-04-08 RX ORDER — LIDOCAINE HYDROCHLORIDE AND EPINEPHRINE 15; 5 MG/ML; UG/ML
INJECTION, SOLUTION EPIDURAL AS NEEDED
Status: DISCONTINUED | OUTPATIENT
Start: 2019-04-08 | End: 2019-04-08 | Stop reason: SURG

## 2019-04-08 RX ORDER — ONDANSETRON 2 MG/ML
4 INJECTION INTRAMUSCULAR; INTRAVENOUS ONCE AS NEEDED
Status: DISCONTINUED | OUTPATIENT
Start: 2019-04-08 | End: 2019-04-08 | Stop reason: HOSPADM

## 2019-04-08 RX ORDER — KETOROLAC TROMETHAMINE 30 MG/ML
30 INJECTION, SOLUTION INTRAMUSCULAR; INTRAVENOUS ONCE
Status: COMPLETED | OUTPATIENT
Start: 2019-04-08 | End: 2019-04-08

## 2019-04-08 RX ORDER — OXYTOCIN-SODIUM CHLORIDE 0.9% IV SOLN 30 UNIT/500ML 30-0.9/5 UT/ML-%
650 SOLUTION INTRAVENOUS ONCE
Status: COMPLETED | OUTPATIENT
Start: 2019-04-08 | End: 2019-04-08

## 2019-04-08 RX ORDER — OXYCODONE HYDROCHLORIDE AND ACETAMINOPHEN 5; 325 MG/1; MG/1
2 TABLET ORAL EVERY 4 HOURS PRN
Status: DISCONTINUED | OUTPATIENT
Start: 2019-04-08 | End: 2019-04-08 | Stop reason: HOSPADM

## 2019-04-08 RX ORDER — SODIUM CHLORIDE 0.9 % (FLUSH) 0.9 %
3 SYRINGE (ML) INJECTION EVERY 12 HOURS SCHEDULED
Status: DISCONTINUED | OUTPATIENT
Start: 2019-04-08 | End: 2019-04-08 | Stop reason: HOSPADM

## 2019-04-08 RX ORDER — OXYTOCIN-SODIUM CHLORIDE 0.9% IV SOLN 30 UNIT/500ML 30-0.9/5 UT/ML-%
2-24 SOLUTION INTRAVENOUS
Status: DISCONTINUED | OUTPATIENT
Start: 2019-04-08 | End: 2019-04-08 | Stop reason: HOSPADM

## 2019-04-08 RX ORDER — CEPHALEXIN 250 MG/1
500 CAPSULE ORAL 4 TIMES DAILY
Status: DISPENSED | OUTPATIENT
Start: 2019-04-08 | End: 2019-04-10

## 2019-04-08 RX ORDER — SODIUM CHLORIDE 0.9 % (FLUSH) 0.9 %
1-10 SYRINGE (ML) INJECTION AS NEEDED
Status: DISCONTINUED | OUTPATIENT
Start: 2019-04-08 | End: 2019-04-08 | Stop reason: HOSPADM

## 2019-04-08 RX ORDER — PROMETHAZINE HYDROCHLORIDE 25 MG/ML
12.5 INJECTION, SOLUTION INTRAMUSCULAR; INTRAVENOUS EVERY 4 HOURS PRN
Status: DISCONTINUED | OUTPATIENT
Start: 2019-04-08 | End: 2019-04-08 | Stop reason: HOSPADM

## 2019-04-08 RX ORDER — TRISODIUM CITRATE DIHYDRATE AND CITRIC ACID MONOHYDRATE 500; 334 MG/5ML; MG/5ML
30 SOLUTION ORAL ONCE
Status: DISCONTINUED | OUTPATIENT
Start: 2019-04-08 | End: 2019-04-08 | Stop reason: HOSPADM

## 2019-04-08 RX ADMIN — SODIUM CHLORIDE, POTASSIUM CHLORIDE, SODIUM LACTATE AND CALCIUM CHLORIDE 1000 ML: 600; 310; 30; 20 INJECTION, SOLUTION INTRAVENOUS at 13:16

## 2019-04-08 RX ADMIN — CEPHALEXIN 500 MG: 250 CAPSULE ORAL at 12:34

## 2019-04-08 RX ADMIN — DOCUSATE SODIUM 100 MG: 100 CAPSULE, LIQUID FILLED ORAL at 21:13

## 2019-04-08 RX ADMIN — LIDOCAINE HYDROCHLORIDE AND EPINEPHRINE 2 ML: 15; 5 INJECTION, SOLUTION EPIDURAL at 13:03

## 2019-04-08 RX ADMIN — ROPIVACAINE HYDROCHLORIDE 10 ML/HR: 2 INJECTION, SOLUTION EPIDURAL; INFILTRATION at 13:33

## 2019-04-08 RX ADMIN — KETOROLAC TROMETHAMINE 30 MG: 30 INJECTION, SOLUTION INTRAMUSCULAR at 21:11

## 2019-04-08 RX ADMIN — Medication: at 21:12

## 2019-04-08 RX ADMIN — SODIUM CHLORIDE, POTASSIUM CHLORIDE, SODIUM LACTATE AND CALCIUM CHLORIDE 1000 ML: 600; 310; 30; 20 INJECTION, SOLUTION INTRAVENOUS at 12:06

## 2019-04-08 RX ADMIN — IBUPROFEN 600 MG: 600 TABLET, FILM COATED ORAL at 19:33

## 2019-04-08 RX ADMIN — WITCH HAZEL 1 PAD: 500 SOLUTION RECTAL; TOPICAL at 21:12

## 2019-04-08 RX ADMIN — OXYTOCIN 85 ML/HR: 10 INJECTION INTRAVENOUS at 18:06

## 2019-04-08 RX ADMIN — FENTANYL CITRATE 100 MCG: 50 INJECTION, SOLUTION INTRAMUSCULAR; INTRAVENOUS at 13:33

## 2019-04-08 RX ADMIN — Medication 10 MG: at 13:16

## 2019-04-08 RX ADMIN — BENZOCAINE 1 APPLICATION: 5.6 OINTMENT TOPICAL at 21:13

## 2019-04-08 RX ADMIN — OXYTOCIN 2 MILLI-UNITS/MIN: 10 INJECTION INTRAVENOUS at 08:53

## 2019-04-08 RX ADMIN — SODIUM CHLORIDE, POTASSIUM CHLORIDE, SODIUM LACTATE AND CALCIUM CHLORIDE 125 ML/HR: 600; 310; 30; 20 INJECTION, SOLUTION INTRAVENOUS at 14:03

## 2019-04-08 RX ADMIN — CEPHALEXIN 500 MG: 250 CAPSULE ORAL at 19:00

## 2019-04-08 RX ADMIN — SODIUM CHLORIDE, POTASSIUM CHLORIDE, SODIUM LACTATE AND CALCIUM CHLORIDE 125 ML/HR: 600; 310; 30; 20 INJECTION, SOLUTION INTRAVENOUS at 08:39

## 2019-04-08 RX ADMIN — OXYTOCIN 650 ML/HR: 10 INJECTION INTRAVENOUS at 17:33

## 2019-04-08 RX ADMIN — LIDOCAINE HYDROCHLORIDE AND EPINEPHRINE 3 ML: 15; 5 INJECTION, SOLUTION EPIDURAL at 13:15

## 2019-04-08 RX ADMIN — ACETAMINOPHEN 1000 MG: 500 TABLET, FILM COATED ORAL at 23:50

## 2019-04-08 NOTE — INTERVAL H&P NOTE
H&P updated. The patient was examined and cervix 4/60/-2, cephalic. Continue plan for pitocin induction. Epidural at patient request. Patient requests AROM after epidural.     Mine Hussein MD

## 2019-04-08 NOTE — PLAN OF CARE
Problem: Labor (Cervical Ripen, Induct, Augment) (Adult,Obstetrics,Pediatric)  Goal: Signs and Symptoms of Listed Potential Problems Will be Absent, Minimized or Managed (Labor)  Outcome: Outcome(s) achieved Date Met: 04/08/19      Problem: Postpartum (Vaginal Delivery) (Adult,Obstetrics,Pediatric)  Goal: Signs and Symptoms of Listed Potential Problems Will be Absent, Minimized or Managed (Postpartum)  Outcome: Ongoing (interventions implemented as appropriate)

## 2019-04-08 NOTE — L&D DELIVERY NOTE
[facility]  Vaginal Delivery Note    Delivery     Delivery:    May Mckenna  [[id number]]         Chucho Mckenna [[id number]]   Vaginal, Spontaneous      Date of Birth:     May Mckenna  [[id number]]      Chucho Mckenna [[id number]]   4/8/2021     Time of Birth:  Gestational Age    May Mckenna  [[id number]]      Chucho Mckenna [[id number]]   5:29 PM    39w3d     Anesthesia:    May Mckenna  [[id number]]         Chucho Mckenna [[id number]]   Epidural      Delivering clinician:    May Mckenna  [[id number]]         Chucho Mckenna [[id number]]         Forceps?   No   Vacuum? No    Shoulder dystocia present: No        Delivery narrative:  Viable female infant delivered OA over intact perineum. Nuchal cord x3 easily reduced. Anterior shoulder delivered easily followed by posterior shoulder and remainder of infant body. Infant placed on maternal abdomen, bulb suctioned and dried. Umbilical cord clamped x2 and cut after 60 second delay. Placenta then delivered spontaneously with gentle traction, intact with 3VC. Bilateral periurethral tears bleeding and thus repaired with 3-0 vicryl interrupted suture. First degree laceration repaired with 3-0 vicryl suture. Urethra straight catheterized for clear urin. Fundus then firm. Counts correct. EBL 300ml.         Infant    Findings:    May Mckenna  [[id number]]   adult     Chucho Mckenna [[id number]]   female   infant     Infant observations: Weight:      May Mckenna  [[id number]]   No birth weight on file.     Chucho Mckenna [[id number]]   3195 g (7 lb 0.7 oz)    Length:      May Mckenna  [[id number]]         Chucho Mckenna [[id number]]   18   in  Observations/Comments:       May Mckenna  [[id number]]         Chucho Mckenna [[id number]]           Apgars:    May Mckenna  [[id number]]         Chucho Mckenna [[id number]]   8    @ 1 minute /       Bala  Pending  [[id number]]         Nicky Mckennal [[id number]]   9    @ 5 minutes   Infant Name: Casiya      Placenta, Cord, and Fluid    Placenta delivered     Bala, Pending  [[id number]]         Chucho Mckenna [[id number]]   Spontaneous   at        Bala, Pending  [[id number]]         Chucho Mckenna [[id number]]   4/8/2019  5:32 PM      Cord:    Bala Pending  [[id number]]         Chucho Mckenna [[id number]]   3 vessels   present.   Nuchal Cord?  yes; Number of nuchal loops present:       Bala, Pending  [[id number]]         Chucho Mckenna [[id number]]   3     Cord blood obtained:    Bala Pending  [[id number]]         Chucho Mckenna [[id number]]   Yes     Cord gases obtained:     Bala Pending  [[id number]]         Chucho Mckenna [[id number]]   No     Cord gas results: Venous:  No results found for: PHCVEN    Arterial:  No results found for: PHCART     Repair    Episiotomy:    Bala Pending  [[id number]]   None     Chucho Mckenna [[id number]]   None     Lacerations: Yes  Laceration Information  Laceration Repaired?   Perineal:      Bala Pending  [[id number]]   1st     Nicky Mckennal [[id number]]   1st      Bala, Pending  [[id number]]         Nicky Mckennal [[id number]]         Periurethral:      Bala Pending  [[id number]]   bilateral     Nicky Mckennal [[id number]]   bilateral      Bala, Pending  [[id number]]         Nicky Mckennal [[id number]]         Labial:      Bala Pending  [[id number]]         Nicky Mckennal [[id number]]          Bala Pending  [[id number]]         Nicky Mckennal [[id number]]         Sulcus:      Bala Pending  [[id number]]         Louisa Mckennarl [[id number]]          Bala, Pending  [[id number]]         Nicky Mckennal [[id number]]         Vaginal:      Bala, Pending  [[id number]]         Chucho Mckenna  [[id number]]          Bala, Pending  [[id number]]         Chucho Mckenna [[id number]]         Cervical:      Bala, Pending  [[id number]]         Chucho Mckenna [[id number]]          Bala, Pending  [[id number]]         Chucho Mckenna [[id number]]           Suture used for repair: 3-0 Vicryl   Estimated Blood Loss: Est. Blood Loss (mL): 300 mL(Filed from Delivery Summary) (04/08/19 0492)           Complications  none    Disposition  Mother to Mother Baby/Postpartum  in stable condition currently.  Baby to remains with mom  in stable condition currently.      Mine Hussein MD  04/08/19  6:11 PM

## 2019-04-08 NOTE — ANESTHESIA PREPROCEDURE EVALUATION
Anesthesia Evaluation     Patient summary reviewed and Nursing notes reviewed   NPO Solid Status: > 6 hours  NPO Liquid Status: > 6 hours           Airway   Mallampati: II  TM distance: >3 FB  Neck ROM: full  Dental      Pulmonary - negative pulmonary ROS   Cardiovascular - negative cardio ROS        Neuro/Psych  (+) headaches,     GI/Hepatic/Renal/Endo    (+) obesity,       Musculoskeletal (-) negative ROS    Abdominal    Substance History - negative use     OB/GYN    (+) Pregnant,         Other                        Anesthesia Plan    ASA 3     epidural     Anesthetic plan, all risks, benefits, and alternatives have been provided, discussed and informed consent has been obtained with: patient.

## 2019-04-08 NOTE — ANESTHESIA PROCEDURE NOTES
Labor Epidural      Patient location during procedure: OB  Performed By  Anesthesiologist: Darek Bill MD  CRNA: Aileen Vicente CRNA  Preanesthetic Checklist  Completed: patient identified, surgical consent, pre-op evaluation, timeout performed, IV checked, risks and benefits discussed and monitors and equipment checked  Additional Notes  Initial epidural placed MAKENNA @ L3-4 , no heme no CSF- epidural cath in 5cm. After test dose, legs very heavy, noted + aspiration clear fluid. Denies H/A. Epidural catheter removed and replaced at L4-5, neg test dose, no aspirate. Patient positioned with HOB elevated 60-75 degrees, VSS,  No adverse sequelae after epidural replacement.  Prep:  Pt Position:sitting  Sterile Tech:cap, gloves, mask and sterile barrier  Prep:DuraPrep  Monitoring:blood pressure monitoring  Epidural Block Procedure:  Approach:midline  Guidance:palpation technique  Location:L4-L5  Needle Type:Tuohy  Needle Gauge:17 G  Loss of Resistance Medium: saline  Loss of Resistance: 7cm  Cath Depth at skin:12 cm  Paresthesia: none  Aspiration:negative  Test Dose:negative  Number of Attempts: 2  Post Assessment:  Dressing:occlusive dressing applied and secured with tape  Pt Tolerance:patient tolerated the procedure well with no apparent complications  Complications:no

## 2019-04-09 LAB
BASOPHILS # BLD AUTO: 0.02 10*3/MM3 (ref 0–0.2)
BASOPHILS NFR BLD AUTO: 0.2 % (ref 0–1.5)
DEPRECATED RDW RBC AUTO: 43.1 FL (ref 37–54)
EOSINOPHIL # BLD AUTO: 0.23 10*3/MM3 (ref 0–0.4)
EOSINOPHIL NFR BLD AUTO: 1.8 % (ref 0.3–6.2)
ERYTHROCYTE [DISTWIDTH] IN BLOOD BY AUTOMATED COUNT: 14.1 % (ref 12.3–15.4)
HCT VFR BLD AUTO: 33.6 % (ref 34–46.6)
HCV AB SER DONR QL: NORMAL
HGB BLD-MCNC: 10.9 G/DL (ref 12–15.9)
IMM GRANULOCYTES # BLD AUTO: 0.05 10*3/MM3 (ref 0–0.05)
IMM GRANULOCYTES NFR BLD AUTO: 0.4 % (ref 0–0.5)
LYMPHOCYTES # BLD AUTO: 2.68 10*3/MM3 (ref 0.7–3.1)
LYMPHOCYTES NFR BLD AUTO: 20.8 % (ref 19.6–45.3)
MCH RBC QN AUTO: 27.5 PG (ref 26.6–33)
MCHC RBC AUTO-ENTMCNC: 32.4 G/DL (ref 31.5–35.7)
MCV RBC AUTO: 84.8 FL (ref 79–97)
MONOCYTES # BLD AUTO: 1.25 10*3/MM3 (ref 0.1–0.9)
MONOCYTES NFR BLD AUTO: 9.7 % (ref 5–12)
NEUTROPHILS # BLD AUTO: 8.71 10*3/MM3 (ref 1.4–7)
NEUTROPHILS NFR BLD AUTO: 67.5 % (ref 42.7–76)
PLATELET # BLD AUTO: 178 10*3/MM3 (ref 140–450)
PMV BLD AUTO: 11.3 FL (ref 6–12)
RBC # BLD AUTO: 3.96 10*6/MM3 (ref 3.77–5.28)
WBC NRBC COR # BLD: 12.89 10*3/MM3 (ref 3.4–10.8)

## 2019-04-09 PROCEDURE — 86803 HEPATITIS C AB TEST: CPT | Performed by: OBSTETRICS & GYNECOLOGY

## 2019-04-09 PROCEDURE — 85025 COMPLETE CBC W/AUTO DIFF WBC: CPT | Performed by: OBSTETRICS & GYNECOLOGY

## 2019-04-09 PROCEDURE — 99024 POSTOP FOLLOW-UP VISIT: CPT | Performed by: OBSTETRICS & GYNECOLOGY

## 2019-04-09 RX ADMIN — ACETAMINOPHEN 1000 MG: 500 TABLET, FILM COATED ORAL at 06:06

## 2019-04-09 RX ADMIN — DOCUSATE SODIUM 100 MG: 100 CAPSULE, LIQUID FILLED ORAL at 08:10

## 2019-04-09 RX ADMIN — IBUPROFEN 600 MG: 600 TABLET, FILM COATED ORAL at 03:13

## 2019-04-09 RX ADMIN — CEPHALEXIN 500 MG: 250 CAPSULE ORAL at 19:35

## 2019-04-09 RX ADMIN — IBUPROFEN 600 MG: 600 TABLET, FILM COATED ORAL at 09:05

## 2019-04-09 RX ADMIN — CEPHALEXIN 500 MG: 250 CAPSULE ORAL at 06:06

## 2019-04-09 RX ADMIN — ACETAMINOPHEN 1000 MG: 500 TABLET, FILM COATED ORAL at 11:48

## 2019-04-09 RX ADMIN — IBUPROFEN 600 MG: 600 TABLET, FILM COATED ORAL at 15:40

## 2019-04-09 RX ADMIN — CEPHALEXIN 500 MG: 250 CAPSULE ORAL at 11:48

## 2019-04-09 NOTE — ANESTHESIA POSTPROCEDURE EVALUATION
Patient: Kym Mckenna    Procedure Summary     Date:  04/08/19 Room / Location:      Anesthesia Start:  1244 Anesthesia Stop:  1729    Procedure:  LABOR ANALGESIA Diagnosis:      Scheduled Providers:   Provider:  Darek Bill MD    Anesthesia Type:  epidural ASA Status:  3          Anesthesia Type: epidural  Last vitals  BP   117/62 (04/09/19 0900)   Temp   97.6 °F (36.4 °C) (04/09/19 0900)   Pulse   94 (04/09/19 0900)   Resp   18 (04/09/19 0900)     SpO2   100 % (04/08/19 1339)     Post Anesthesia Care and Evaluation    Patient location during evaluation: bedside  Patient participation: complete - patient participated  Level of consciousness: awake and alert  Pain management: adequate  Airway patency: patent  Anesthetic complications: No anesthetic complications    Cardiovascular status: acceptable  Respiratory status: acceptable  Hydration status: acceptable  Post Neuraxial Block status: Motor and sensory function returned to baseline  Comments: Patient c/o headache.  Increased in intensity with increased activity.  Improves with rest.  Patient encouraged to rest, lying flat and increase caffeinated fluid consumption as much as possible.  We will follow up 4/10.

## 2019-04-09 NOTE — PLAN OF CARE
Problem: Patient Care Overview  Goal: Plan of Care Review  Outcome: Ongoing (interventions implemented as appropriate)   04/08/19 2045 04/09/19 0718   Plan of Care Review   Progress --  improving   OTHER   Outcome Summary --  VSS, voiding, bottlefeeding   Coping/Psychosocial   Plan of Care Reviewed With patient;significant other --        Problem: Postpartum (Vaginal Delivery) (Adult,Obstetrics,Pediatric)  Goal: Signs and Symptoms of Listed Potential Problems Will be Absent, Minimized or Managed (Postpartum)  Outcome: Ongoing (interventions implemented as appropriate)   04/09/19 0718   Goal/Outcome Evaluation   Problems Assessed (Postpartum Vaginal Delivery) all   Problems Present (Postpartum Vag Deliv) none

## 2019-04-09 NOTE — PROGRESS NOTES
Usman Mckenna  : 1990  MRN: [id number]  CSN: [id number]    Postpartum Day #1  Subjective   Her pain is well controlled.  Has some intermittent cramping. Vaginal bleeding is less than a normal period.       Objective     Min/max vitals past 24 hours:   Temp  Min: 97.9 °F (36.6 °C)  Max: 98.6 °F (37 °C)  BP  Min: 74/38  Max: 151/67  Pulse  Min: 81  Max: 117  Resp  Min: 16  Max: 18        Abdomen: soft, non-tender; bowel sounds normal; no masses   fundus firm and non-tender   Pelvic: deferred     Results from last 7 days   Lab Units 19  0515 19  0845   WBC 10*3/mm3 12.89* 10.09   HEMOGLOBIN g/dL 10.9* 12.3   HEMATOCRIT % 33.6* 37.2   PLATELETS 10*3/mm3 178 222     Lab Results   Component Value Date    RH Positive 2019    HEPBSAG Negative 2018        Assessment   1. Postpartum Day #1 S/P vaginal delivery  2. Postpartum anemia   3. Doing well     Plan   1. Continue routine postpartum care    Mine Hussein MD  2019  8:19 AM

## 2019-04-10 VITALS
BODY MASS INDEX: 38.09 KG/M2 | RESPIRATION RATE: 16 BRPM | WEIGHT: 207 LBS | TEMPERATURE: 98.1 F | HEART RATE: 84 BPM | SYSTOLIC BLOOD PRESSURE: 123 MMHG | DIASTOLIC BLOOD PRESSURE: 73 MMHG | HEIGHT: 62 IN | OXYGEN SATURATION: 100 %

## 2019-04-10 PROCEDURE — 99024 POSTOP FOLLOW-UP VISIT: CPT | Performed by: OBSTETRICS & GYNECOLOGY

## 2019-04-10 RX ORDER — IBUPROFEN 600 MG/1
600 TABLET ORAL EVERY 6 HOURS PRN
Qty: 60 TABLET | Refills: 1 | Status: SHIPPED | OUTPATIENT
Start: 2019-04-10 | End: 2019-04-12

## 2019-04-10 RX ORDER — PSEUDOEPHEDRINE HCL 30 MG
100 TABLET ORAL 2 TIMES DAILY PRN
Qty: 60 EACH | Refills: 1 | Status: SHIPPED | OUTPATIENT
Start: 2019-04-10 | End: 2019-04-12

## 2019-04-10 RX ADMIN — CEPHALEXIN 500 MG: 250 CAPSULE ORAL at 00:08

## 2019-04-10 RX ADMIN — ACETAMINOPHEN 1000 MG: 500 TABLET, FILM COATED ORAL at 12:08

## 2019-04-10 RX ADMIN — IBUPROFEN 600 MG: 600 TABLET, FILM COATED ORAL at 08:07

## 2019-04-10 RX ADMIN — DOCUSATE SODIUM 100 MG: 100 CAPSULE, LIQUID FILLED ORAL at 08:07

## 2019-04-10 NOTE — PLAN OF CARE
Problem: Patient Care Overview  Goal: Plan of Care Review  Outcome: Outcome(s) achieved Date Met: 04/10/19   04/10/19 1042   Plan of Care Review   Progress improving   OTHER   Outcome Summary VSS; lochia wnl; ready for discharge   Coping/Psychosocial   Plan of Care Reviewed With patient

## 2019-04-10 NOTE — PLAN OF CARE
Problem: Patient Care Overview  Goal: Plan of Care Review  Outcome: Ongoing (interventions implemented as appropriate)   04/10/19 0643   Plan of Care Review   Progress improving   OTHER   Outcome Summary vss,voiding,denies pain,bottlefeeding, fundus and lochia wdl   Coping/Psychosocial   Plan of Care Reviewed With patient     Goal: Individualization and Mutuality  Outcome: Ongoing (interventions implemented as appropriate)   04/10/19 0643   Individualization   Patient Specific Preferences bottlefeeding   Patient Specific Goals (Include Timeframe) none verbalized       Problem: Postpartum (Vaginal Delivery) (Adult,Obstetrics,Pediatric)  Goal: Signs and Symptoms of Listed Potential Problems Will be Absent, Minimized or Managed (Postpartum)  Outcome: Ongoing (interventions implemented as appropriate)   04/10/19 0643   Goal/Outcome Evaluation   Problems Assessed (Postpartum Vaginal Delivery) all   Problems Present (Postpartum Vag Deliv) none

## 2019-04-10 NOTE — DISCHARGE SUMMARY
Discharge Summary     Usman Mckenna  : 1990  MRN: [id number]  [facility]: [id number]    Date of Admission: 2019   Date of Discharge:  4/10/2019   Delivering Physician:    May Mckenna  [[id number]]         Chucho Mckenna [[id number]]   Mine Hussein         Admission Diagnosis: 1. Pregnancy [Z34.90]   Discharge Diagnosis: 1. Same as above plus  2. Pregnancy at 39w3d - delivered       Procedures:    Priscilla Mckennaing  [[id number]]      Chucho Mckenna [[id number]]   2019   -      Priscilla Mckennaing  [[id number]]         Chucho Mckenna [[id number]]   Vaginal, Spontaneous        Hospital Course  Patient is a 28 y.o.  who at 39w3d had a vaginal birth.  Her postpartum course was without complications.  On PPD #2 she was ready for discharge.  She had normal lochia and pain well controlled with oral medications. Patient desires permanent sterilization. Will plan for interval tubal postpartum. Patient voiced understanding.     Infant     Bala Pending  [[id number]]   adult     Chucho Mckenna [[id number]]   female   fetus weighing      Priscilla Mckennaing  [[id number]]   No birth weight on file.     Chucho Mckenna [[id number]]   3195 g (7 lb 0.7 oz)    Apgars -       Bala Pending  [[id number]]         Chucho Mckenna [[id number]]   8   @ 1 minute /       Priscilla Mckennaing  [[id number]]         Chucho Mckenna [[id number]]   9   @ 5 minutes.    Discharge labs  Lab Results   Component Value Date    WBC 12.89 (H) 2019    HGB 10.9 (L) 2019    HCT 33.6 (L) 2019     2019       Discharge Medications     Discharge Medications      New Medications      Instructions Start Date   benzocaine 20 % rectal ointment  Commonly known as:  AMERICAINE   1 application, Rectal, As Needed      benzocaine-lanolin-aloe vera 20-0.5 % aerosol topical spray  Commonly known as:  DERMOPLAST   Topical, As Needed      docusate  sodium 100 MG capsule   100 mg, Oral, 2 Times Daily PRN      hydrocortisone 2.5 % rectal cream  Commonly known as:  ANUSOL-HC   1 application, Rectal, As Needed      ibuprofen 600 MG tablet  Commonly known as:  ADVIL,MOTRIN   600 mg, Oral, Every 6 Hours PRN      witch hazel-glycerin pad  Commonly known as:  TUCKS   1 each, Topical, As Needed         Continue These Medications      Instructions Start Date   Prenatal 27-1 27-1 MG tablet tablet   1 tablet, Oral, Daily         Stop These Medications    cephalexin 500 MG capsule  Commonly known as:  KEFLEX            Discharge Disposition Home or Self Care   Condition on Discharge: good   Follow-up: 6 weeks with Dr. Keenan Hussein MD  4/10/2019

## 2019-04-10 NOTE — PROGRESS NOTES
Usman Mckenna  : 1990  MRN: [id number]  CSN: [id number]    Postpartum Day #2  Subjective   Her pain is well controlled.  Vaginal bleeding is less than a normal period.      Objective     Min/max vitals past 24 hours:   Temp  Min: 97.6 °F (36.4 °C)  Max: 98.5 °F (36.9 °C)  BP  Min: 117/62  Max: 123/74  Pulse  Min: 72  Max: 94  Resp  Min: 16  Max: 18        General: well developed; well nourished  no acute distress   Abdomen: fundus firm and non-tender   Pelvic: Not performed   Ext: Calves NT     Results from last 7 days   Lab Units 19  0515 19  0845   WBC 10*3/mm3 12.89* 10.09   HEMOGLOBIN g/dL 10.9* 12.3   HEMATOCRIT % 33.6* 37.2   PLATELETS 10*3/mm3 178 222     Lab Results   Component Value Date    RH Positive 2019    HEPBSAG Negative 2018        Assessment   1. Postpartum Day #2 S/P vaginal delivery  2. Doing well   3. Postpartum anemia   4. Desires permanent sterilization      Plan   1. Discharge to home  2. Advised no tampons or intercourse for 6 weeks.  3. D/C questions all answered  4. Follow-up appointment in 6 week(s)  5. Will plan for interval laparoscopic tubal    Mine Hussein MD  4/10/2019  8:29 AM

## 2019-04-10 NOTE — ANESTHESIA POSTPROCEDURE EVALUATION
Patient: Kym Mckenna    Procedure Summary     Date:  04/08/19 Room / Location:      Anesthesia Start:  1244 Anesthesia Stop:  1729    Procedure:  LABOR ANALGESIA Diagnosis:      Scheduled Providers:   Provider:  Darek Bill MD    Anesthesia Type:  epidural ASA Status:  3          Anesthesia Type: epidural  Last vitals  BP   123/74 (04/10/19 0000)   Temp   98.5 °F (36.9 °C) (04/10/19 0000)   Pulse   72 (04/10/19 0000)   Resp   16 (04/10/19 0000)     SpO2   100 % (04/08/19 1339)     Post Anesthesia Care and Evaluation    Patient location during evaluation: bedside  Patient participation: complete - patient participated  Level of consciousness: awake and alert  Pain management: adequate  Airway patency: patent  Anesthetic complications: No anesthetic complications    Cardiovascular status: acceptable  Respiratory status: acceptable  Hydration status: acceptable    Comments: Patient states her headache is mild and she does not feel an epidural blood patch is necessary.  She is planning to go home today.  Patient advised to continue increased intake of PO caffeinated beverages and rest as much as possible.  Informed patient if she has any further questions or concerns to contact our service.

## 2019-04-12 RX ORDER — IBUPROFEN 600 MG/1
600 TABLET ORAL EVERY 6 HOURS PRN
Qty: 60 TABLET | Refills: 0 | Status: SHIPPED | OUTPATIENT
Start: 2019-04-12 | End: 2021-08-24

## 2019-04-12 RX ORDER — PSEUDOEPHEDRINE HCL 30 MG
100 TABLET ORAL 2 TIMES DAILY PRN
Qty: 60 EACH | Refills: 0 | Status: SHIPPED | OUTPATIENT
Start: 2019-04-12 | End: 2021-08-24

## 2019-04-15 ENCOUNTER — TELEPHONE (OUTPATIENT)
Dept: OBSTETRICS AND GYNECOLOGY | Facility: CLINIC | Age: 29
End: 2019-04-15

## 2019-04-15 NOTE — TELEPHONE ENCOUNTER
Dr Hussein    Pt calling to let Dr Hussein know she still has a migraine. Per pt the anesthesiologist mentioned a blood patch.    Pt call back [phone]    [facility] at [facility] [phone]

## 2019-04-15 NOTE — TELEPHONE ENCOUNTER
I would recommend tylenol and motrin as needed and caffeine if she has a history of drinking caffeine. I would not be able to do the blood patch so if headache worsens depending on position I suppose we could reach out to anesthesiology. I just don't know how to do that as outpatient?    Mine Hussein MD

## 2019-04-16 ENCOUNTER — HOSPITAL ENCOUNTER (OUTPATIENT)
Facility: HOSPITAL | Age: 29
Setting detail: OBSERVATION
Discharge: HOME | End: 2019-04-17
Attending: OBSTETRICS & GYNECOLOGY | Admitting: OBSTETRICS & GYNECOLOGY

## 2019-04-16 ENCOUNTER — ANESTHESIA EVENT (OUTPATIENT)
Dept: LABOR AND DELIVERY | Facility: HOSPITAL | Age: 29
End: 2019-04-16

## 2019-04-16 ENCOUNTER — ANESTHESIA (OUTPATIENT)
Dept: LABOR AND DELIVERY | Facility: HOSPITAL | Age: 29
End: 2019-04-16

## 2019-04-16 LAB
ALP SERPL-CCNC: 172 U/L (ref 39–117)
ALT SERPL W P-5'-P-CCNC: 20 U/L (ref 1–33)
AST SERPL-CCNC: 23 U/L (ref 1–32)
BILIRUB SERPL-MCNC: 0.5 MG/DL (ref 0.2–1.2)
CREAT BLD-MCNC: 0.57 MG/DL (ref 0.57–1)
DEPRECATED RDW RBC AUTO: 44.8 FL (ref 37–54)
ERYTHROCYTE [DISTWIDTH] IN BLOOD BY AUTOMATED COUNT: 14.5 % (ref 12.3–15.4)
HCT VFR BLD AUTO: 41.2 % (ref 34–46.6)
HGB BLD-MCNC: 13.2 G/DL (ref 12–15.9)
LDH SERPL-CCNC: 232 U/L (ref 135–214)
MCH RBC QN AUTO: 27.6 PG (ref 26.6–33)
MCHC RBC AUTO-ENTMCNC: 32 G/DL (ref 31.5–35.7)
MCV RBC AUTO: 86 FL (ref 79–97)
PLATELET # BLD AUTO: 333 10*3/MM3 (ref 140–450)
PMV BLD AUTO: 11.8 FL (ref 6–12)
RBC # BLD AUTO: 4.79 10*6/MM3 (ref 3.77–5.28)
URATE SERPL-MCNC: 6.2 MG/DL (ref 2.4–5.7)
WBC NRBC COR # BLD: 5.79 10*3/MM3 (ref 3.4–10.8)

## 2019-04-16 PROCEDURE — 96376 TX/PRO/DX INJ SAME DRUG ADON: CPT

## 2019-04-16 PROCEDURE — 82247 BILIRUBIN TOTAL: CPT | Performed by: OBSTETRICS & GYNECOLOGY

## 2019-04-16 PROCEDURE — 84450 TRANSFERASE (AST) (SGOT): CPT | Performed by: OBSTETRICS & GYNECOLOGY

## 2019-04-16 PROCEDURE — G0378 HOSPITAL OBSERVATION PER HR: HCPCS

## 2019-04-16 PROCEDURE — 82565 ASSAY OF CREATININE: CPT | Performed by: OBSTETRICS & GYNECOLOGY

## 2019-04-16 PROCEDURE — 85027 COMPLETE CBC AUTOMATED: CPT | Performed by: OBSTETRICS & GYNECOLOGY

## 2019-04-16 PROCEDURE — 96366 THER/PROPH/DIAG IV INF ADDON: CPT

## 2019-04-16 PROCEDURE — 83615 LACTATE (LD) (LDH) ENZYME: CPT | Performed by: OBSTETRICS & GYNECOLOGY

## 2019-04-16 PROCEDURE — 84550 ASSAY OF BLOOD/URIC ACID: CPT | Performed by: OBSTETRICS & GYNECOLOGY

## 2019-04-16 PROCEDURE — 96365 THER/PROPH/DIAG IV INF INIT: CPT

## 2019-04-16 PROCEDURE — 25010000002 MAGNESIUM SULFATE-LACT RINGERS 40 GM/580ML SOLUTION: Performed by: OBSTETRICS & GYNECOLOGY

## 2019-04-16 PROCEDURE — 99218 PR INITIAL OBSERVATION CARE/DAY 30 MINUTES: CPT | Performed by: OBSTETRICS & GYNECOLOGY

## 2019-04-16 PROCEDURE — 84460 ALANINE AMINO (ALT) (SGPT): CPT | Performed by: OBSTETRICS & GYNECOLOGY

## 2019-04-16 PROCEDURE — 96375 TX/PRO/DX INJ NEW DRUG ADDON: CPT

## 2019-04-16 PROCEDURE — 84075 ASSAY ALKALINE PHOSPHATASE: CPT | Performed by: OBSTETRICS & GYNECOLOGY

## 2019-04-16 RX ORDER — ACETAMINOPHEN 500 MG
1000 TABLET ORAL EVERY 8 HOURS PRN
Status: DISCONTINUED | OUTPATIENT
Start: 2019-04-16 | End: 2019-04-17 | Stop reason: HOSPADM

## 2019-04-16 RX ORDER — ACETAMINOPHEN, ASPIRIN AND CAFFEINE 250; 250; 65 MG/1; MG/1; MG/1
2 TABLET, FILM COATED ORAL EVERY 6 HOURS PRN
COMMUNITY
End: 2021-08-24

## 2019-04-16 RX ORDER — MAGNESIUM SULF/RINGERS LACTATE 40 G/500ML
2 PLASTIC BAG, INJECTION (ML) INTRAVENOUS CONTINUOUS
Status: DISCONTINUED | OUTPATIENT
Start: 2019-04-16 | End: 2019-04-17 | Stop reason: HOSPADM

## 2019-04-16 RX ORDER — LABETALOL HYDROCHLORIDE 5 MG/ML
20 INJECTION, SOLUTION INTRAVENOUS
Status: DISCONTINUED | OUTPATIENT
Start: 2019-04-16 | End: 2019-04-17 | Stop reason: HOSPADM

## 2019-04-16 RX ORDER — SODIUM CHLORIDE 0.9 % (FLUSH) 0.9 %
3-10 SYRINGE (ML) INJECTION AS NEEDED
Status: DISCONTINUED | OUTPATIENT
Start: 2019-04-16 | End: 2019-04-17 | Stop reason: HOSPADM

## 2019-04-16 RX ORDER — SODIUM CHLORIDE 0.9 % (FLUSH) 0.9 %
3 SYRINGE (ML) INJECTION EVERY 12 HOURS SCHEDULED
Status: DISCONTINUED | OUTPATIENT
Start: 2019-04-16 | End: 2019-04-17 | Stop reason: HOSPADM

## 2019-04-16 RX ORDER — NIFEDIPINE 30 MG/1
30 TABLET, EXTENDED RELEASE ORAL
Status: DISCONTINUED | OUTPATIENT
Start: 2019-04-16 | End: 2019-04-17 | Stop reason: HOSPADM

## 2019-04-16 RX ORDER — ACETAMINOPHEN 500 MG
1000 TABLET ORAL ONCE
Status: COMPLETED | OUTPATIENT
Start: 2019-04-16 | End: 2019-04-16

## 2019-04-16 RX ADMIN — Medication 2 G/HR: at 12:43

## 2019-04-16 RX ADMIN — ACETAMINOPHEN 1000 MG: 500 TABLET, FILM COATED ORAL at 14:28

## 2019-04-16 RX ADMIN — LABETALOL HYDROCHLORIDE 20 MG: 5 INJECTION, SOLUTION INTRAVENOUS at 15:33

## 2019-04-16 RX ADMIN — ACETAMINOPHEN 1000 MG: 500 TABLET, FILM COATED ORAL at 22:20

## 2019-04-16 RX ADMIN — NIFEDIPINE 30 MG: 30 TABLET, FILM COATED, EXTENDED RELEASE ORAL at 14:28

## 2019-04-16 RX ADMIN — BENZOCAINE AND LEVOMENTHOL: 200; 5 SPRAY TOPICAL at 17:10

## 2019-04-16 NOTE — ANESTHESIA PREPROCEDURE EVALUATION
Anesthesia Evaluation     Patient summary reviewed and Nursing notes reviewed                Airway   Mallampati: II  TM distance: >3 FB  Neck ROM: full  Dental      Pulmonary - negative pulmonary ROS   Cardiovascular     (+) hypertension,       Neuro/Psych  (+) headaches,     GI/Hepatic/Renal/Endo - negative ROS     Musculoskeletal (-) negative ROS    Abdominal    Substance History - negative use     OB/GYN    (+) Preeclampsia,         Other - negative ROS                       Anesthesia Plan    ASA 3     epidural   (Blood Patch)  Anesthetic plan, all risks, benefits, and alternatives have been provided, discussed and informed consent has been obtained with: patient.

## 2019-04-16 NOTE — H&P
[facility]  Kym Mckenna  : 1990  MRN: [id number]  CSN: [id number]                      Subjective   Kym Mckenna is a 28 y.o. year old  who PPD#8 s/p . Patient was known to have a wet tap epidural and was offered a blood patch prior to discharge by anesthesia but patient declined. She presented today to the triage area with plan to get the blood patch with an 8/10 headache but with severe range blood pressures. Reports occasional vision changes. Otherwise no symptoms. Has used Excedrin migraine and ibuprofen at home. Of note, new FOB with this past pregnancy.     Past Medical History:   Diagnosis Date   • Abnormal Pap smear of cervix 2018    colpo WNL   • Ovarian cyst      Past Surgical History:   Procedure Laterality Date   • BREAST BIOPSY Left     WNL   • COLPOSCOPY  2018    no biopsies   • COLPOSCOPY  2018    WNL   • INDUCED   2009    EAB   • INDUCED   2009    EAB     Obstetric History       T2      L2     SAB1   TAB2   Ectopic0   Molar0   Multiple0   Live Births2       # Outcome Date GA Lbr Mikel/2nd Weight Sex Delivery Anes PTL Lv   5 Term 19 39w3d / 01:13 3195 g (7 lb 0.7 oz) F Vag-Spont EPI N ABIOLA      Name: TAY MCKENNA      Apgar1:  8                Apgar5: 9   4 TAB 2009 8w0d    TAB      3 TAB 2009 8w0d    TAB      2 SAB 06 8w0d    SAB      1 Term 05 39w3d  3147 g (6 lb 15 oz) M Vag-Spont EPI N ABIOLA      Complications: Status post induction of labor      Obstetric Comments   G1: Cardale, No GDM/HTN   FOB #1: Pregnancy #1; #3   FOB #2: Pregnancy #2     Social History    Tobacco Use      Smoking status: Never Smoker      Smokeless tobacco: Never Used      Current Facility-Administered Medications:   •  acetaminophen (TYLENOL) tablet 1,000 mg, 1,000 mg, Oral, Once, Mine Hussein MD  •  labetalol (NORMODYNE,TRANDATE) injection 20 mg, 20 mg, Intravenous, Q10 Min PRN, Mine Hussein  "MD  •  Magnesium Sulfate-Lact Ringers 40 GM/580ML, 2 g/hr, Intravenous, Continuous, Mine Hussein MD, Last Rate: 29 mL/hr at 04/16/19 1243, 2 g/hr at 04/16/19 1243  •  NIFEdipine XL (PROCARDIA XL) 24 hr tablet 30 mg, 30 mg, Oral, Q24H, Mine Hussein MD  •  sodium chloride 0.9 % flush 3 mL, 3 mL, Intravenous, Q12H, Mine Hussein MD  •  sodium chloride 0.9 % flush 3-10 mL, 3-10 mL, Intravenous, PRN, Mine Hussein MD    No Known Allergies    Review of Systems   Constitutional: Negative for chills and fever.   HENT: Negative for congestion and sore throat.    Respiratory: Negative for shortness of breath and wheezing.    Cardiovascular: Negative for chest pain and palpitations.   Gastrointestinal: Negative for abdominal pain, nausea and vomiting.   Genitourinary: Negative for frequency, vaginal bleeding and vaginal pain.   Musculoskeletal: Negative for back pain and myalgias.   Neurological: Positive for headaches. Negative for dizziness and light-headedness.   Psychiatric/Behavioral: Negative for confusion. The patient is not nervous/anxious.          Objective   /78   Temp 98.6 °F (37 °C)   Resp 16   Ht 157.5 cm (62\")   Wt 93.9 kg (207 lb)   SpO2 99%   BMI 37.86 kg/m²   General: well developed; well nourished  no acute distress   Heart: regular rate and rhythm, S1, S2 normal, no murmur, click, rub or gallop   Lungs: breathing is unlabored  clear to auscultation bilaterally   Abdomen: soft, non-tender; no masses  no umbilical or inguinal hernias are present  no hepato-splenomegaly   Pelvis:: Not performed.   Labs  CBC:   Lab Results   Component Value Date     04/16/2019    HGB 13.2 04/16/2019    HCT 41.2 04/16/2019    WBC 5.79 04/16/2019     Pre-eclampsia Panel:   Lab Results   Component Value Date    AST 23 04/16/2019    ALT 20 04/16/2019    URICACID 6.2 (H) 04/16/2019    BUN <5 (L) 03/27/2019    CREATININE 0.57 04/16/2019    GLUCOSE 72 03/27/2019       Imaging Reviewed  None     "   Assessment   1. PPD#8 s/p   2. Elevated blood pressures and headache concerning for postpartum preeclampsia      Plan   1. PEP and CBC  2. Start magnesium for seizure prophylaxis and plan for 24 total hours  3. Procardia XL 30mg   4. Labetalol IV protocol   5. She declines blood patch from anesthesia today.       Mine Hussein MD  2019  1:10 PM

## 2019-04-17 VITALS
WEIGHT: 207 LBS | HEIGHT: 62 IN | SYSTOLIC BLOOD PRESSURE: 130 MMHG | TEMPERATURE: 98 F | OXYGEN SATURATION: 99 % | RESPIRATION RATE: 18 BRPM | BODY MASS INDEX: 38.09 KG/M2 | DIASTOLIC BLOOD PRESSURE: 84 MMHG | HEART RATE: 90 BPM

## 2019-04-17 PROCEDURE — 96366 THER/PROPH/DIAG IV INF ADDON: CPT

## 2019-04-17 PROCEDURE — 99217 PR OBSERVATION CARE DISCHARGE MANAGEMENT: CPT | Performed by: OBSTETRICS & GYNECOLOGY

## 2019-04-17 PROCEDURE — G0378 HOSPITAL OBSERVATION PER HR: HCPCS

## 2019-04-17 PROCEDURE — 25010000002 MAGNESIUM SULFATE-LACT RINGERS 40 GM/580ML SOLUTION: Performed by: OBSTETRICS & GYNECOLOGY

## 2019-04-17 RX ORDER — NIFEDIPINE 30 MG/1
30 TABLET, FILM COATED, EXTENDED RELEASE ORAL
Qty: 30 TABLET | Refills: 1 | Status: SHIPPED | OUTPATIENT
Start: 2019-04-18 | End: 2019-05-29

## 2019-04-17 RX ORDER — DEXTROSE AND SODIUM CHLORIDE 5; .2 G/100ML; G/100ML
96 INJECTION, SOLUTION INTRAVENOUS CONTINUOUS
Status: DISCONTINUED | OUTPATIENT
Start: 2019-04-17 | End: 2019-04-17 | Stop reason: HOSPADM

## 2019-04-17 RX ADMIN — NIFEDIPINE 30 MG: 30 TABLET, FILM COATED, EXTENDED RELEASE ORAL at 09:10

## 2019-04-17 RX ADMIN — Medication 2 G/HR: at 06:58

## 2019-04-17 RX ADMIN — ACETAMINOPHEN 1000 MG: 500 TABLET, FILM COATED ORAL at 11:21

## 2019-04-17 RX ADMIN — DEXTROSE AND SODIUM CHLORIDE 96 ML/HR: 5; 200 INJECTION, SOLUTION INTRAVENOUS at 07:00

## 2019-04-17 NOTE — DISCHARGE SUMMARY
Usman Mckenna  : 1990  MRN: [id number]  CSN: [id number]    Discharge Summary      Date of Admission: 2019   Date of Discharge: 2019   Discharge Diagnoses: 1. PPD#9 s/p   2. Postpartum preeclampsia    Procedures Performed: None      Consults: None   Brief History: Patient is a 28 y.o.  who presented to the [facility] with complaints of headache with plan to obtain a blood patch but she also had elevated blood pressures..   Hospital Course:  Patient is a 28-year-old  032 who presented postpartum day 8 from a spontaneous vaginal delivery with report of headache.  She was found to have severe range blood pressures.  She was known to have a wet tap Epidural and had presented for possible blood patch.  However given severe range blood pressure, preeclampsia labs were obtained and magnesium was started for seizure prophylaxis for presumed severe preeclampsia. She did require IV antihypertensive treatment x1.  Her blood pressures did improve while she was on the magnesium.  On hospital day 2 she did receive a blood patch which did improve her headache.  She received magnesium for a total of 24 hours.  Her blood pressures remained overall normal to mild range on Procardia XL 30 mg daily once the magnesium was turned off.  She was discharged home with preeclamptic precautions and plan for follow-up in the office in 1 week.   Pending Studies: Pending tests: none   Condition at discharge: rapidly improving   Discharge Medications:    Your medication list      START taking these medications      Instructions Last Dose Given Next Dose Due   NIFEdipine CC 30 MG 24 hr tablet  Commonly known as:  ADALAT CC  Start taking on:  2019      Take 1 tablet by mouth Daily for 60 days.          CONTINUE taking these medications      Instructions Last Dose Given Next Dose Due   aspirin-acetaminophen-caffeine 250-250-65 MG per tablet  Commonly known as:  EXCEDRIN MIGRAINE      Take 2  tablets by mouth Every 6 (Six) Hours As Needed for Headache.       benzocaine 20 % rectal ointment  Commonly known as:  AMERICAINE      Insert 1 application into the rectum As Needed for Hemorrhoids.       benzocaine-lanolin-aloe vera 20-0.5 % aerosol topical spray  Commonly known as:  DERMOPLAST      Apply  topically to the appropriate area as directed As Needed for Mild Pain  (perineal pain).       docusate sodium 100 MG capsule      Take 100 mg by mouth 2 (Two) Times a Day As Needed (constipation).       hydrocortisone 2.5 % rectal cream  Commonly known as:  ANUSOL-HC      Insert 1 application into the rectum As Needed for Hemorrhoids.       ibuprofen 600 MG tablet  Commonly known as:  ADVIL,MOTRIN      Take 1 tablet by mouth Every 6 (Six) Hours As Needed for Mild Pain .       Prenatal 27-1 27-1 MG tablet tablet      Take 1 tablet by mouth Daily.       witch hazel-glycerin pad  Commonly known as:  TUCKS      Apply 1 pad topically to the appropriate area as directed As Needed for Irritation or Hemorrhoids.             Where to Get Your Medications      [facility] [address] [facility] [address] - [phone]  - [phone] [place] [address]    Phone:  [phone] ·   NIFEdipine CC 30 MG 24 hr tablet        Discharge Disposition: home   Follow-up: Future Appointments   Date Time Provider Department Center   4/24/2019  3:40 PM [facility]            This note has been electronically signed.    Mine Hussein MD  April 18, 2019

## 2019-04-17 NOTE — PROGRESS NOTES
Patient feeling better after blood patch  Vitals:    04/17/19 1646   BP: 130/84   Pulse: 90   Resp:    Temp:    SpO2:      Blood pressures off of Magnesium  Plan for discharge to home on procardia with outpatient follow up in one week   Reviewed s/s of preeclampsia     Mine Hussein MD

## 2019-04-17 NOTE — PROGRESS NOTES
CARLTON Usman Mckenna  : 1990  MRN: [id number]  CSN: [id number]    Postpartum Day #9 Readmission  Subjective   Reports she still has a headache and it is worse with sitting up. She denies vision changes or epigastric pain.      Objective     Min/max vitals past 24 hours:   Temp  Min: 97.7 °F (36.5 °C)  Max: 98.8 °F (37.1 °C)  BP  Min: 118/61  Max: 183/110  Pulse  Min: 68  Max: 96  Resp  Min: 16  Max: 18        General: well developed; well nourished  no acute distress  Heart: RRR  Resp: unlabored breathing, CTAB   Abdomen: soft, non-tender; no masses  no umbilical or inguinal hernias are present  no hepato-splenomegaly   Pelvic: Not performed   Ext: Calves NT, 1+ DTRs, no clonus, slight edema bilaterally      Lab Results   Component Value Date    WBC 5.79 2019    HGB 13.2 2019    HCT 41.2 2019    MCV 86.0 2019     2019    RH Positive 2019    HEPBSAG Negative 2018           Intake/Output Summary (Last 24 hours) at 2019 0831  Last data filed at 2019 0555  Gross per 24 hour   Intake 576 ml   Output 3800 ml   Net -3224 ml       Assessment   1. Postpartum Day #9 S/P vaginal delivery  2. Postpartum preeclampsia      Plan   1. Continue procardia XL 30mg daily   2. Continue magnesium for total 24 hours   3. RN to contact anesthesia about blood patch   4. Will monitor blood pressures after magnesium off midday to determine discharge to home this evening versus tomorrow morning.     Mine Hussein MD  2019  8:28 AM

## 2019-04-17 NOTE — ANESTHESIA PROCEDURE NOTES
Blood Patch      Patient reassessed immediately prior to procedure    Staff  Anesthesiologist: Woodrow Baeza DO  Resident/CRNA: Alejandra Galvan CRNA  Preanesthetic Checklist  Completed: patient identified, surgical consent, pre-op evaluation, timeout performed, IV checked, risks and benefits discussed and monitors and equipment checked  Blood Patch Prep  Patient position: sitting  Sterile Tech: sterile barrier, cap, gloves and mask.  Prep: ChloraPrep  Patient monitoring: EKG, continuous pulse ox and blood pressure monitoring  Location: L4-L5  Blood patch source: right antecubital IV.  Blood Patch Procedure  Sedation:no    Approach: midline   Guidance: palpation technique  Needle Gauge 17 G  Needle Type: Tuohy  Solution used: autologous blood  Loss of Resistance: 6 cm  Loss of Resistance Medium: air  Blood Patch Source:venous    Amount injected: 10 mL (12 ml)  Assessment  Patient tolerance:patient tolerated the procedure well with no apparent complications  Complications:no  Additional Notes  Patient would not tolerate more than 12ml blood

## 2019-04-18 NOTE — ANESTHESIA POSTPROCEDURE EVALUATION
Patient: Kym Mckenna    Procedure Summary     Date:  04/16/19 Room / Location:      Anesthesia Start:   Anesthesia Stop:      Procedure:  EPIDURAL BLOOD PATCH Diagnosis:      Scheduled Providers:   Provider:      Anesthesia Type:  epidural ASA Status:  3          Anesthesia Type: epidural  Last vitals  BP       Temp       Pulse       Resp         SpO2         Post Anesthesia Care and Evaluation    Patient location during evaluation: bedside  Patient participation: complete - patient participated  Level of consciousness: awake and alert  Pain management: adequate  Airway patency: patent  Anesthetic complications: No anesthetic complications    Cardiovascular status: acceptable  Respiratory status: acceptable  Hydration status: acceptable  Post Neuraxial Block status: Motor and sensory function returned to baseline and No signs or symptoms of PDPH

## 2019-04-24 ENCOUNTER — POSTPARTUM VISIT (OUTPATIENT)
Dept: OBSTETRICS AND GYNECOLOGY | Facility: CLINIC | Age: 29
End: 2019-04-24

## 2019-04-24 VITALS
HEIGHT: 62 IN | BODY MASS INDEX: 33.97 KG/M2 | WEIGHT: 184.6 LBS | DIASTOLIC BLOOD PRESSURE: 84 MMHG | SYSTOLIC BLOOD PRESSURE: 118 MMHG

## 2019-04-24 PROBLEM — O23.40 UTI IN PREGNANCY: Status: RESOLVED | Noted: 2019-01-23 | Resolved: 2019-04-24

## 2019-04-24 PROBLEM — Z34.80 PRENATAL CARE, SUBSEQUENT PREGNANCY: Status: RESOLVED | Noted: 2019-01-23 | Resolved: 2019-04-24

## 2019-04-24 PROCEDURE — 0503F POSTPARTUM CARE VISIT: CPT | Performed by: OBSTETRICS & GYNECOLOGY

## 2019-04-24 NOTE — PROGRESS NOTES
"Subjective   Chief Complaint   Patient presents with   • Postpartum Care     2w2d pp, vag del, baby girl, bottle feeding, B/L periurethral lacs, 1st deg lac. pt also states that she has not had any more headaches.     Kym Mckenna is a 28 y.o. year old  presenting to be seen for her postpartum visit.  She had a vaginal delivery.  Her daughter is doing well.  She was recently admitted for postpartum preeclampsia and spinal headache. She received 24 hours of magnesium, had a blood patch and was started on procardia 30mg daily.   Since delivery she has not been sexually active.  She does not have concerns about post-partum blues/depression.   Reports headache has improved and is currently still taking procardia 30mg daily   She is bottle feeding.  For ongoing contraception, her plans are undecided and considering tubal versus COCPs.    The following portions of the patient's history were reviewed and updated as appropriate:current medications and allergies    Social History    Tobacco Use      Smoking status: Never Smoker      Smokeless tobacco: Never Used      Review of Systems  Constitutional POS: nothing reported    NEG: anorexia or night sweats   Genitourinary POS: nothing reported    NEG: dysuria or hematuria      Gastointestinal POS: nothing reported    NEG: bloating, change in bowel habits, melena or reflux symptoms   Breast POS: nothing reported    NEG: persistent breast lump, skin dimpling or nipple discharge        Objective   /84   Ht 157.5 cm (62\")   Wt 83.7 kg (184 lb 9.6 oz)   Breastfeeding? No   BMI 33.76 kg/m²     General:  well developed; well nourished  no acute distress                  Assessment   1. Normal 2 week postpartum exam  2. Postpartum HTN - controlled with procardia 30mg   3. Spinal headache resolved with blood patch during recent admission      Plan   1. BC options reviewed and compared today: undecided  2. Reviewed s/s of preeclampsia   3. Continue procardia " for now. Will most likely d/c at 6 week visit   4. The importance of keeping all planned follow-up and taking all medications as prescribed was emphasized.  5. Follow up for postpartum visit in one month     No orders of the defined types were placed in this encounter.         This note was electronically signed.    Mine Hussein MD  April 24, 2019    Note: Speech recognition transcription software may have been used to create portions of this document.  An attempt at proofreading has been made but errors in transcription could still be present.

## 2019-05-07 ENCOUNTER — TELEPHONE (OUTPATIENT)
Dept: OBSTETRICS AND GYNECOLOGY | Facility: CLINIC | Age: 29
End: 2019-05-07

## 2019-05-07 DIAGNOSIS — Z30.09 CONSULTATION FOR STERILIZATION: Primary | ICD-10-CM

## 2019-05-07 NOTE — TELEPHONE ENCOUNTER
I will need to see her in the office to discuss because we have not talked about route of tubal ligation yet and she is currently just 4 weeks post delivery and needs to be >6 weeks post delivery.     Thanks,   Mine Hussein MD

## 2019-05-08 NOTE — TELEPHONE ENCOUNTER
Okay, I will place case request and just assume we will do laparoscopic bilateral salpingectomy. Thanks Machelle    Mine Hussein MD

## 2019-05-27 PROBLEM — R51.9 HA (HEADACHE): Status: RESOLVED | Noted: 2019-02-26 | Resolved: 2019-05-27

## 2019-05-27 PROBLEM — Z37.9 NORMAL LABOR: Status: RESOLVED | Noted: 2019-03-30 | Resolved: 2019-05-27

## 2019-05-27 PROBLEM — O35.BXX0 FETAL CARDIAC ANOMALY COMPLICATING PREGNANCY, ANTEPARTUM: Status: RESOLVED | Noted: 2019-02-05 | Resolved: 2019-05-27

## 2019-05-29 ENCOUNTER — POSTPARTUM VISIT (OUTPATIENT)
Dept: OBSTETRICS AND GYNECOLOGY | Facility: CLINIC | Age: 29
End: 2019-05-29

## 2019-05-29 VITALS
WEIGHT: 180.8 LBS | SYSTOLIC BLOOD PRESSURE: 106 MMHG | BODY MASS INDEX: 33.27 KG/M2 | DIASTOLIC BLOOD PRESSURE: 72 MMHG | HEIGHT: 62 IN

## 2019-05-29 DIAGNOSIS — Z30.09 STERILIZATION CONSULT: ICD-10-CM

## 2019-05-29 PROCEDURE — 0503F POSTPARTUM CARE VISIT: CPT | Performed by: OBSTETRICS & GYNECOLOGY

## 2019-05-29 NOTE — PROGRESS NOTES
"Subjective   Chief Complaint   Patient presents with   • Postpartum Care     7w2d PP, vag del, baby girl, b/l periurethral tears, 1st deg lac, pt states no c/o     Reganspencer Mckenna is a 28 y.o. year old  presenting to be seen for her postpartum visit.  She had a vaginal delivery.  Her daughter is doing well.    Since delivery she has not been sexually active.  She does not have concerns about post-partum blues/depression.   Orrick Score = 0  She is bottle feeding.  For ongoing contraception, her plans are tubal ligation.    The following portions of the patient's history were reviewed and updated as appropriate:current medications and allergies    Social History    Tobacco Use      Smoking status: Never Smoker      Smokeless tobacco: Never Used      Review of Systems  Constitutional POS: nothing reported    NEG: anorexia or night sweats   Genitourinary POS: nothing reported    NEG: dysuria or hematuria      Gastointestinal POS: nothing reported    NEG: bloating, change in bowel habits, melena or reflux symptoms   Breast POS: nothing reported    NEG: persistent breast lump, skin dimpling or nipple discharge        Objective   /72   Ht 157.5 cm (62\")   Wt 82 kg (180 lb 12.8 oz)   LMP 2019   Breastfeeding? No   BMI 33.07 kg/m²     General:  well developed; well nourished  no acute distress   Abdomen: soft, non-tender; no masses  no umbilical or inguinal hernias are present  no hepato-splenomegaly   Pelvis: Not performed.          Assessment   1. Normal 6 week postpartum exam  2. Sterilization consult     Plan   1. BC options reviewed and compared today: tubal ligation   2. Will do pelvic exam at time of tubal ligation.   3. Reviewed that pap will need to repeated post op given history of abnormal during her pregnancy   4. Today I discussed with Kym the risks of her upcoming surgical procedure. She desires to proceed with laparoscopic bilateral salpingectomy. Risks including " intraoperative bleeding, infection at the site of surgery, damage to the adjacent surrounding organs, catheter induced urinary tract infections and the small risk for deep vein thrombosis were all explained. Additionally, the small risk for reoperation in the event of unanticipated bleeding or surgical injury was discussed.  All of her questions were answered fully.  She left with a very clear understanding of the preoperative surgical indications and the nature of the surgery for which she is scheduled.  She understands to be NPO after midnight and to be at the preoperative area ~ 1-1/2 hours prior to the scheduled surgical start time.  5. The importance of keeping all planned follow-up and taking all medications as prescribed was emphasized.  6. Follow up for post operative appointment and then for pap     No orders of the defined types were placed in this encounter.         This note was electronically signed.    Mine Hussein MD  May 29, 2019    Note: Speech recognition transcription software may have been used to create portions of this document.  An attempt at proofreading has been made but errors in transcription could still be present.

## 2019-05-30 ENCOUNTER — PREP FOR SURGERY (OUTPATIENT)
Dept: OBSTETRICS AND GYNECOLOGY | Facility: CLINIC | Age: 29
End: 2019-05-30

## 2019-05-30 NOTE — H&P
Subjective   Kym Mckenna is a 28 y.o. year old  who is scheduled for surgery due to desire for permanent sterilization. She understands this is a permanent decision and she does not desire any future pregnancies.     Past Medical History:   Diagnosis Date   • Abnormal Pap smear of cervix 2018    colpo WNL   • Ovarian cyst      Past Surgical History:   Procedure Laterality Date   • BREAST BIOPSY Left     WNL   • COLPOSCOPY  2018    no biopsies   • COLPOSCOPY  2018    WNL   • INDUCED   2009    EAB   • INDUCED   2009    EAB     Obstetric History       T2      L2     SAB1   TAB2   Ectopic0   Molar0   Multiple0   Live Births2       # Outcome Date GA Lbr Mikel/2nd Weight Sex Delivery Anes PTL Lv   5 Term 19 39w3d / 01:13 3195 g (7 lb 0.7 oz) F Vag-Spont EPI N ABIOLA      Name: TAY MCKENNA      Apgar1:  8                Apgar5: 9   4 TAB 2009 8w0d    TAB      3 TAB 2009 8w0d    TAB      2 SAB 06 8w0d    SAB      1 Term 05 39w3d  3147 g (6 lb 15 oz) M Vag-Spont EPI N AIBOLA      Complications: Status post induction of labor      Obstetric Comments   G1: Cardale, No GDM/HTN   FOB #1: Pregnancy #1; #3   FOB #2: Pregnancy #2     Social History     Socioeconomic History   • Marital status: Single     Spouse name: Not on file   • Number of children: Not on file   • Years of education: Not on file   • Highest education level: Not on file   Tobacco Use   • Smoking status: Never Smoker   • Smokeless tobacco: Never Used   Substance and Sexual Activity   • Alcohol use: No     Frequency: Never     Comment: occasionally when not pregnant   • Drug use: No   • Sexual activity: Not Currently     Partners: Male   Social History Narrative    She takes a medication every 3 months to have a period, doesn't remember the name     Prior to Admission medications    Medication Sig Start Date End Date Taking? Authorizing Provider    aspirin-acetaminophen-caffeine (EXCEDRIN MIGRAINE) 250-250-65 MG per tablet Take 2 tablets by mouth Every 6 (Six) Hours As Needed for Headache.    Provider, MD Edgar   benzocaine (AMERICAINE) 20 % rectal ointment Insert 1 application into the rectum As Needed for Hemorrhoids. 4/12/19   Mine Hussein MD   benzocaine-lanolin-aloe vera (DERMOPLAST) 20-0.5 % aerosol topical spray Apply  topically to the appropriate area as directed As Needed for Mild Pain  (perineal pain). 4/12/19   Mine Hussein MD   docusate sodium 100 MG capsule Take 100 mg by mouth 2 (Two) Times a Day As Needed (constipation). 4/12/19   Mine Hussein MD   hydrocortisone (ANUSOL-HC) 2.5 % rectal cream Insert 1 application into the rectum As Needed for Hemorrhoids. 4/12/19   Mine Hussein MD   ibuprofen (ADVIL,MOTRIN) 600 MG tablet Take 1 tablet by mouth Every 6 (Six) Hours As Needed for Mild Pain . 4/12/19   Mine Hussein MD   Prenatal Vit-Fe Fumarate-FA (PRENATAL 27-1) 27-1 MG tablet tablet Take 1 tablet by mouth Daily.    Provider, MD Edgar   witch hazel-glycerin (TUCKS) pad Apply 1 pad topically to the appropriate area as directed As Needed for Irritation or Hemorrhoids. 4/12/19   Mine Hussein MD       No Known Allergies  Social History    Tobacco Use      Smoking status: Never Smoker      Smokeless tobacco: Never Used    Review of Systems   Constitutional: Negative for chills and fever.   HENT: Negative for congestion and sore throat.    Respiratory: Negative for shortness of breath and wheezing.    Cardiovascular: Negative for chest pain and palpitations.   Gastrointestinal: Negative for abdominal pain, nausea and vomiting.   Genitourinary: Negative for frequency, vaginal bleeding and vaginal pain.   Musculoskeletal: Negative for back pain and myalgias.   Neurological: Negative for dizziness, light-headedness and headaches.   Psychiatric/Behavioral: Negative for confusion. The patient is not  nervous/anxious.          Objective   LMP 2019   General: well developed; well nourished  no acute distress  appears stated age  alert and oriented x3   Heart: Not performed.   Lungs: breathing is unlabored   Abdomen: soft, non-tender; no masses  no umbilical or inguinal hernias are present  no hepato-splenomegaly   Pelvis:: Not performed.        Assessment   1. Desires permanent sterilization   2. >6 weeks postpartum ( on 19)     Plan   The methods of female sterilization were discussed.  I discussed laparoscopic tubal occlusion via clips, laparoscopic tubal occlusion via Falope rings and laparoscopic total salpingectomy with Kym.  I explained that the historical failure rate for tubal ligation has been quoted as ~ 1/300.  Data from the CREST study suggests that for certain techniques, the failure rates could be as high as ~ 4%.  Both Essure and salpingectomy may reduce the failure rates historically seen with segmental salpingectomy.   Furthermore, total salpingectomy may be associated with a reduction in the lifetime incidence of ovarian cancer.  As compared to partial salpingectomy, total salpingectomy and Essure are permanent method of female sterilization that cannot be reversed.  I explained to Kym that with failures, there is an increased risk of ectopic pregnancy.  Ruptured ectopic gestations can be life threatening if not treated.  Procedural risks include but not limited to the risk of bleeding, infection, and damage to internal organs were discussed.   Additionally I discussed with Kym regret rate and post-tubal ligation syndrome.  I discussed with Kym that for all practical purposes the procedure should be considered permanent and non-reversable.  If there is not complete certainty regarding sterilization, long acting reversible contraception (LARC's) should be considered.  Other forms of LARC's that were reviewed include Depo-Provera, IUD - Mirena and Nexplanon.   Additionally, non-contraceptive benefits of these methods should be considered in making her final decision. She desires to proceed with laparoscopic bilateral salpingectomy.   Today I discussed with Kym the risks of her upcoming surgical procedure. Risks including intraoperative bleeding, infection at the site of surgery, damage to the adjacent surrounding organs, catheter induced urinary tract infections and the small risk for deep vein thrombosis were all explained. Additionally, the small risk for reoperation in the event of unanticipated bleeding or surgical injury was discussed.  All of her questions were answered fully.  She left with a very clear understanding of the preoperative surgical indications and the nature of the surgery for which she is scheduled.  She understands to be NPO after midnight and to be at the preoperative area ~ 1-1/2 hours prior to the scheduled surgical start time.          Mine Hussein MD  5/30/2019       (Pt's PCP is Provider, No Known)

## 2019-05-31 ENCOUNTER — OUTSIDE FACILITY SERVICE (OUTPATIENT)
Dept: OBSTETRICS AND GYNECOLOGY | Facility: CLINIC | Age: 29
End: 2019-05-31

## 2019-05-31 ENCOUNTER — LAB REQUISITION (OUTPATIENT)
Dept: LAB | Facility: HOSPITAL | Age: 29
End: 2019-05-31

## 2019-05-31 DIAGNOSIS — Z30.2 ENCOUNTER FOR STERILIZATION: ICD-10-CM

## 2019-05-31 PROCEDURE — 58661 LAPAROSCOPY REMOVE ADNEXA: CPT | Performed by: OBSTETRICS & GYNECOLOGY

## 2019-05-31 PROCEDURE — 88302 TISSUE EXAM BY PATHOLOGIST: CPT | Performed by: OBSTETRICS & GYNECOLOGY

## 2019-06-03 ENCOUNTER — TELEPHONE (OUTPATIENT)
Dept: OBSTETRICS AND GYNECOLOGY | Facility: CLINIC | Age: 29
End: 2019-06-03

## 2019-06-03 LAB
CYTO UR: NORMAL
LAB AP CASE REPORT: NORMAL
LAB AP CLINICAL INFORMATION: NORMAL
PATH REPORT.FINAL DX SPEC: NORMAL
PATH REPORT.GROSS SPEC: NORMAL

## 2019-06-04 PROBLEM — Z98.890 POST-OPERATIVE STATE: Status: ACTIVE | Noted: 2019-06-04

## 2019-06-04 PROBLEM — Z30.09 STERILIZATION CONSULT: Status: RESOLVED | Noted: 2019-02-05 | Resolved: 2019-06-04

## 2019-06-21 ENCOUNTER — OFFICE VISIT (OUTPATIENT)
Dept: OBSTETRICS AND GYNECOLOGY | Facility: CLINIC | Age: 29
End: 2019-06-21

## 2019-06-21 VITALS
WEIGHT: 183.6 LBS | BODY MASS INDEX: 33.79 KG/M2 | HEIGHT: 62 IN | SYSTOLIC BLOOD PRESSURE: 120 MMHG | DIASTOLIC BLOOD PRESSURE: 72 MMHG

## 2019-06-21 DIAGNOSIS — R87.612 LGSIL ON PAP SMEAR OF CERVIX: ICD-10-CM

## 2019-06-21 DIAGNOSIS — Z98.890 POST-OPERATIVE STATE: Primary | ICD-10-CM

## 2019-06-21 PROCEDURE — 99024 POSTOP FOLLOW-UP VISIT: CPT | Performed by: OBSTETRICS & GYNECOLOGY

## 2019-06-21 NOTE — PROGRESS NOTES
"Subjective   Chief Complaint   Patient presents with   • Post-op     3w0d post op, lap b/l salp. pt states no c/o     Kym Mckenna is a 28 y.o. year old  presenting to be seen for her post-operative visit.  Currently she reports no problems with eating, bowel movements, voiding, or wound drainage and pain is well controlled.    The pathology results from her procedure are in Kym's record and are benign.      OTHER THINGS SHE WANTS TO DISCUSS TODAY:  Nothing else    The following portions of the patient's history were reviewed and updated as appropriate:current medications and allergies       Objective   /72   Ht 157.5 cm (62\")   Wt 83.3 kg (183 lb 9.6 oz)   LMP 2019   Breastfeeding? No   BMI 33.58 kg/m²     General:  well developed; well nourished  no acute distress   Abdomen: soft, non-tender; no masses  no umbilical or inguinal hernias are present  no hepato-splenomegaly  incision is clean, dry, intact and without drainage   Pelvis: Clinical staff was present for exam  External genitalia:  normal appearance of the external genitalia including Bartholin's and [place]'s glands.  :  urethral meatus normal;  Vaginal:  normal pink mucosa without prolapse or lesions.  Cervix:  normal appearance.  Uterus:  normal size, shape and consistency.  Adnexa:  normal bimanual exam of the adnexa.  Rectal:  digital rectal exam not performed; anus visually normal appearing.          Assessment   1. S/P laparoscopic bilateral salpingectomy  2. History of LGSIL pap with normal colposcopy per records 2018 and patient was told to have repeat pap in 6 months      Plan   1. May return to full activity with no restrictions  2. Pap and HPV were done today.  If she does not receive the results of the Pap within 2 weeks  time, she was instructed to call to find out the results.  I explained to Kym that the recommendations for Pap smear interval in a low risk patient's has lengthened to 5 years " time if both cytology and HPV testing were normal.  I encouraged her to be seen yearly for a full physical exam including breast and pelvic exam even during the off years when PAP's will not be performed.  3. The importance of keeping all planned follow-up and taking all medications as prescribed was emphasized.  4. Follow up for annual exam in 6 months.    No orders of the defined types were placed in this encounter.         This note was electronically signed.    Mine Hussein MD  June 21, 2019    Note: Speech recognition transcription software may have been used to create portions of this document.  An attempt at proofreading has been made but errors in transcription could still be present.

## 2019-10-10 ENCOUNTER — TELEPHONE (OUTPATIENT)
Dept: OBSTETRICS AND GYNECOLOGY | Facility: CLINIC | Age: 29
End: 2019-10-10

## 2019-10-10 DIAGNOSIS — R30.0 DYSURIA: Primary | ICD-10-CM

## 2019-10-10 NOTE — TELEPHONE ENCOUNTER
[phone] patient returned my call. Per patient she wanted to know if the UA will be able to tell her if she has a yeast infection as well. I advised patient if she is not sure whether she has a yeast infection or UTI she just needs to schedule an appointment with Dr. Hussein. Patient verbalized understanding. I transferred patient to [facility] to get patient scheduled to see Dr. Hussein.

## 2019-10-10 NOTE — TELEPHONE ENCOUNTER
franky    Pt may have uti again. Needs either labs done to verify if she does or not.  Had one when pregnant.    Pharm: [facility]

## 2019-10-10 NOTE — TELEPHONE ENCOUNTER
Per Tri have pt go to lab to see if have UTI and if if does not show UTI will call in script for yeast infection. Verbalized this to pt and pt understood.

## 2021-08-24 ENCOUNTER — OFFICE VISIT (OUTPATIENT)
Dept: FAMILY MEDICINE CLINIC | Facility: CLINIC | Age: 31
End: 2021-08-24

## 2021-08-24 VITALS
SYSTOLIC BLOOD PRESSURE: 120 MMHG | DIASTOLIC BLOOD PRESSURE: 78 MMHG | TEMPERATURE: 97.8 F | HEIGHT: 62 IN | WEIGHT: 203 LBS | OXYGEN SATURATION: 98 % | HEART RATE: 81 BPM | BODY MASS INDEX: 37.36 KG/M2

## 2021-08-24 DIAGNOSIS — E66.9 OBESITY (BMI 30-39.9): ICD-10-CM

## 2021-08-24 DIAGNOSIS — D24.2 FIBROADENOMA OF LEFT BREAST: ICD-10-CM

## 2021-08-24 DIAGNOSIS — R21 SKIN RASH: Primary | ICD-10-CM

## 2021-08-24 PROCEDURE — 99203 OFFICE O/P NEW LOW 30 MIN: CPT | Performed by: NURSE PRACTITIONER

## 2021-08-24 RX ORDER — CETIRIZINE HYDROCHLORIDE 10 MG/1
10 TABLET ORAL DAILY
Qty: 30 TABLET | Refills: 2 | Status: SHIPPED | OUTPATIENT
Start: 2021-08-24 | End: 2022-06-14

## 2021-08-24 NOTE — PROGRESS NOTES
yKm Mckenna presents today to establish care.    Establish Care, Rash (Pt states she thinks she's allergic to something but she's not sure what. She would like to see an allergist. ), and weight concern       HPI   Pt presents today to establish care.  She has a PMH of obesity.    She has never been allergic to anything.  Now she feels like she is allergic to cherries.  She had to get a steroid shot on Saturday and she took the oral pills.  She states that she has always eaten cherries and they have not bothered her.  She had a cluster of bumps on her left forearm.  She then started to get bumps on her right forearm that she thinks is r/t the cherries.  Her skin has been irritated all month.  This has never happened to her before.  The steroids have helped to clear up the rash.  Now the rash is leaving bruises on her arm.    She has never even had seasonal allergies.      She is wanting to talk about her weight today.  When she reaches a certain weight everything goes downhill for her.  She lost weight and then gained it back again.  She feels like her weight is causing her issues.  She will get on a low carb diet and then lose 20-25lbs.  Once she starts eating regularly, she picks up the weight quickly.  She was exercising (walking, jump roping).    She got a nodule in her left breast after she gained weight. She feels like most of her health issues are related to her weight gain.       Past Medical History:   Diagnosis Date   • Abnormal Pap smear of cervix 2018    colpo WNL   • Allergic 19    Skin allergies , cherries   • History of cervical dysplasia    • Obesity 2021    Overweight   • Ovarian cyst         Past Surgical History:   Procedure Laterality Date   • BREAST BIOPSY Left     WNL   • COLPOSCOPY  2018    no biopsies   • COLPOSCOPY  2018    WNL   • INDUCED   2009    EAB   • INDUCED   2009    EAB   • TUBAL ABDOMINAL LIGATION          Family History  "  Problem Relation Age of Onset   • Diabetes Father    • Hypertension Father    • Hypertension Mother    • Breast cancer Neg Hx    • Ovarian cancer Neg Hx    • Colon cancer Neg Hx    • Uterine cancer Neg Hx         Social History     Socioeconomic History   • Marital status: Single     Spouse name: Not on file   • Number of children: Not on file   • Years of education: Not on file   • Highest education level: Not on file   Tobacco Use   • Smoking status: Never Smoker   • Smokeless tobacco: Never Used   Substance and Sexual Activity   • Alcohol use: Yes     Alcohol/week: 0.0 standard drinks     Comment: Socially   • Drug use: No   • Sexual activity: Yes     Partners: Male     Birth control/protection: None        No current outpatient medications on file prior to visit.     No current facility-administered medications on file prior to visit.       No Known Allergies     Vitals:    08/24/21 1512   BP: 120/78   Pulse: 81   Temp: 97.8 °F (36.6 °C)   SpO2: 98%   Weight: 92.1 kg (203 lb)   Height: 157.5 cm (62\")   PainSc: 0-No pain        Physical Exam  Vitals reviewed.   Constitutional:       Appearance: Normal appearance. She is obese.   HENT:      Head: Normocephalic and atraumatic.      Right Ear: Tympanic membrane, ear canal and external ear normal.      Left Ear: Tympanic membrane, ear canal and external ear normal.      Nose: Nose normal.      Mouth/Throat:      Mouth: Mucous membranes are moist.      Pharynx: Oropharynx is clear.   Cardiovascular:      Rate and Rhythm: Normal rate and regular rhythm.      Heart sounds: Normal heart sounds.   Pulmonary:      Effort: Pulmonary effort is normal.      Breath sounds: Normal breath sounds.   Abdominal:      General: Bowel sounds are normal.      Palpations: Abdomen is soft.      Tenderness: There is no abdominal tenderness. There is no guarding or rebound.   Musculoskeletal:      Cervical back: Neck supple.   Skin:     General: Skin is warm.      Findings: Rash present. "             Comments: Clustered erythematous papules to above indicated areas.  There is some bruising noted to AC of RUE from scratching.     Neurological:      General: No focal deficit present.      Mental Status: She is alert and oriented to person, place, and time.   Psychiatric:         Mood and Affect: Mood normal.         Behavior: Behavior normal.         Thought Content: Thought content normal.         Judgment: Judgment normal.        Diagnoses and all orders for this visit:    1. Skin rash (Primary) -this is likely dermatitis.  She thinks this is r/t eating cherries.  She has had improvement of her symptoms with steroid shot and pills.    --Referral placed to allergist.  --Start cetirizine 10 mg daily.  --She does have triamcinolone cream that was given to her at urgent care.  She can use this twice a day for up to 14 days.  Only apply to rash, do not apply to intact skin.  --If symptoms worsen or do not improve, return to clinic.  -     Ambulatory Referral to Allergy  -     cetirizine (zyrTEC) 10 MG tablet; Take 1 tablet by mouth Daily.  Dispense: 30 tablet; Refill: 2    2. Obesity (BMI 30-39.9) -patient has had great success with weight loss when following the low-carb diet.  She does find that after about 2 months she starts craving potatoes and then will go off the diet and gain her weight back.  Discussed with patient ways to follow a low-carb diet, but also eat small amounts of the foods she craves so that she can be consistent and successful with the diet.  Provided her with information on this.  --Encouraged routine physical activity.      3. Fibroadenoma of left breast -patient had biopsy in 2016 that showed fibroadenoma.      Return if symptoms worsen or fail to improve.    Ana Maria Houston, KURT

## 2021-08-24 NOTE — PATIENT INSTRUCTIONS
"Healthy Eating  Following a healthy eating pattern may help you to achieve and maintain a healthy body weight, reduce the risk of chronic disease, and live a long and productive life. It is important to follow a healthy eating pattern at an appropriate calorie level for your body. Your nutritional needs should be met primarily through food by choosing a variety of nutrient-rich foods.  What are tips for following this plan?  Reading food labels  · Read labels and choose the following:  ? Reduced or low sodium.  ? Juices with 100% fruit juice.  ? Foods with low saturated fats and high polyunsaturated and monounsaturated fats.  ? Foods with whole grains, such as whole wheat, cracked wheat, brown rice, and wild rice.  ? Whole grains that are fortified with folic acid. This is recommended for women who are pregnant or who want to become pregnant.  · Read labels and avoid the following:  ? Foods with a lot of added sugars. These include foods that contain brown sugar, corn sweetener, corn syrup, dextrose, fructose, glucose, high-fructose corn syrup, honey, invert sugar, lactose, malt syrup, maltose, molasses, raw sugar, sucrose, trehalose, or turbinado sugar.  § Do not eat more than the following amounts of added sugar per day:  § 6 teaspoons (25 g) for women.  § 9 teaspoons (38 g) for men.  ? Foods that contain processed or refined starches and grains.  ? Refined grain products, such as white flour, degermed cornmeal, white bread, and white rice.  Shopping  · Choose nutrient-rich snacks, such as vegetables, whole fruits, and nuts. Avoid high-calorie and high-sugar snacks, such as potato chips, fruit snacks, and candy.  · Use oil-based dressings and spreads on foods instead of solid fats such as butter, stick margarine, or cream cheese.  · Limit pre-made sauces, mixes, and \"instant\" products such as flavored rice, instant noodles, and ready-made pasta.  · Try more plant-protein sources, such as tofu, tempeh, black beans, " edamame, lentils, nuts, and seeds.  · Explore eating plans such as the Mediterranean diet or vegetarian diet.  Cooking  · Use oil to sauté or stir-maguire foods instead of solid fats such as butter, stick margarine, or lard.  · Try baking, boiling, grilling, or broiling instead of frying.  · Remove the fatty part of meats before cooking.  · Steam vegetables in water or broth.  Meal planning    · At meals, imagine dividing your plate into fourths:  ? One-half of your plate is fruits and vegetables.  ? One-fourth of your plate is whole grains.  ? One-fourth of your plate is protein, especially lean meats, poultry, eggs, tofu, beans, or nuts.  · Include low-fat dairy as part of your daily diet.  Lifestyle  · Choose healthy options in all settings, including home, work, school, restaurants, or stores.  · Prepare your food safely:  ? Wash your hands after handling raw meats.  ? Keep food preparation surfaces clean by regularly washing with hot, soapy water.  ? Keep raw meats separate from ready-to-eat foods, such as fruits and vegetables.  ? , meat, poultry, and eggs to the recommended internal temperature.  ? Store foods at safe temperatures. In general:  § Keep cold foods at 40°F (4.4°C) or below.  § Keep hot foods at 140°F (60°C) or above.  § Keep your freezer at 0°F (-17.8°C) or below.  § Foods are no longer safe to eat when they have been between the temperatures of 40°-140°F (4.4-60°C) for more than 2 hours.  What foods should I eat?  Fruits  Aim to eat 2 cup-equivalents of fresh, canned (in natural juice), or frozen fruits each day. Examples of 1 cup-equivalent of fruit include 1 small apple, 8 large strawberries, 1 cup canned fruit, ½ cup dried fruit, or 1 cup 100% juice.  Vegetables  Aim to eat 2½-3 cup-equivalents of fresh and frozen vegetables each day, including different varieties and colors. Examples of 1 cup-equivalent of vegetables include 2 medium carrots, 2 cups raw, leafy greens, 1 cup chopped  vegetable (raw or cooked), or 1 medium baked potato.  Grains  Aim to eat 6 ounce-equivalents of whole grains each day. Examples of 1 ounce-equivalent of grains include 1 slice of bread, 1 cup ready-to-eat cereal, 3 cups popcorn, or ½ cup cooked rice, pasta, or cereal.  Meats and other proteins  Aim to eat 5-6 ounce-equivalents of protein each day. Examples of 1 ounce-equivalent of protein include 1 egg, 1/2 cup nuts or seeds, or 1 tablespoon (16 g) peanut butter. A cut of meat or fish that is the size of a deck of cards is about 3-4 ounce-equivalents.  · Of the protein you eat each week, try to have at least 8 ounces come from seafood. This includes salmon, trout, herring, and anchovies.  Dairy  Aim to eat 3 cup-equivalents of fat-free or low-fat dairy each day. Examples of 1 cup-equivalent of dairy include 1 cup (240 mL) milk, 8 ounces (250 g) yogurt, 1½ ounces (44 g) natural cheese, or 1 cup (240 mL) fortified soy milk.  Fats and oils  · Aim for about 5 teaspoons (21 g) per day. Choose monounsaturated fats, such as canola and olive oils, avocados, peanut butter, and most nuts, or polyunsaturated fats, such as sunflower, corn, and soybean oils, walnuts, pine nuts, sesame seeds, sunflower seeds, and flaxseed.  Beverages  · Aim for six 8-oz glasses of water per day. Limit coffee to three to five 8-oz cups per day.  · Limit caffeinated beverages that have added calories, such as soda and energy drinks.  · Limit alcohol intake to no more than 1 drink a day for nonpregnant women and 2 drinks a day for men. One drink equals 12 oz of beer (355 mL), 5 oz of wine (148 mL), or 1½ oz of hard liquor (44 mL).  Seasoning and other foods  · Avoid adding excess amounts of salt to your foods. Try flavoring foods with herbs and spices instead of salt.  · Avoid adding sugar to foods.  · Try using oil-based dressings, sauces, and spreads instead of solid fats.  This information is based on general [place] nutrition guidelines. For more  "information, visit chooseDivvyshotplate.gov. Exact amounts may vary based on your nutrition needs.  Summary  · A healthy eating plan may help you to maintain a healthy weight, reduce the risk of chronic diseases, and stay active throughout your life.  · Plan your meals. Make sure you eat the right portions of a variety of nutrient-rich foods.  · Try baking, boiling, grilling, or broiling instead of frying.  · Choose healthy options in all settings, including home, work, school, restaurants, or stores.  This information is not intended to replace advice given to you by your health care provider. Make sure you discuss any questions you have with your health care provider.  Document Revised: 04/01/2019 Document Reviewed: 04/01/2019  [employer] Patient Education © 2021 [employer]    https://www.diabeteseducator.org/docs/default-source/living-with-diabetes/conquering-the-grocery-store-v1.pdf?sfvrsn=4\">   Carbohydrate Counting for Diabetes Mellitus, Adult  Carbohydrate counting is a method of keeping track of how many carbohydrates you eat. Eating carbohydrates naturally increases the amount of sugar (glucose) in the blood. Counting how many carbohydrates you eat improves your blood glucose control, which helps you manage your diabetes.  It is important to know how many carbohydrates you can safely have in each meal. This is different for every person. A dietitian can help you make a meal plan and calculate how many carbohydrates you should have at each meal and snack.  What foods contain carbohydrates?  Carbohydrates are found in the following foods:  · Grains, such as breads and cereals.  · Dried beans and soy products.  · Starchy vegetables, such as potatoes, peas, and corn.  · Fruit and fruit juices.  · Milk and yogurt.  · Sweets and snack foods, such as cake, cookies, candy, chips, and soft drinks.  How do I count carbohydrates in foods?  There are two ways to count carbohydrates in food. You can read food labels or learn " standard serving sizes of foods. You can use either of the methods or a combination of both.  Using the Nutrition Facts label  The Nutrition Facts list is included on the labels of almost all packaged foods and beverages in the [place] It includes:  · The serving size.  · Information about nutrients in each serving, including the grams (g) of carbohydrate per serving.  To use the Nutrition Facts:  · Decide how many servings you will have.  · Multiply the number of servings by the number of carbohydrates per serving.  · The resulting number is the total amount of carbohydrates that you will be having.  Learning the standard serving sizes of foods  When you eat carbohydrate foods that are not packaged or do not include Nutrition Facts on the label, you need to measure the servings in order to count the amount of carbohydrates.  · Measure the foods that you will eat with a food scale or measuring cup, if needed.  · Decide how many standard-size servings you will eat.  · Multiply the number of servings by 15. For foods that contain carbohydrates, one serving equals 15 g of carbohydrates.  ? For example, if you eat 2 cups or 10 oz (300 g) of strawberries, you will have eaten 2 servings and 30 g of carbohydrates (2 servings x 15 g = 30 g).  · For foods that have more than one food mixed, such as soups and casseroles, you must count the carbohydrates in each food that is included.  The following list contains standard serving sizes of common carbohydrate-rich foods. Each of these servings has about 15 g of carbohydrates:  · 1 slice of bread.  · 1 six-inch (15 cm) tortilla.  · ? cup or 2 oz (53 g) cooked rice or pasta.  · ½ cup or 3 oz (85 g) cooked or canned, drained and rinsed beans or lentils.  · ½ cup or 3 oz (85 g) starchy vegetable, such as peas, corn, or squash.  · ½ cup or 4 oz (120 g) hot cereal.  · ½ cup or 3 oz (85 g) boiled or mashed potatoes, or ¼ or 3 oz (85 g) of a large baked potato.  · ½ cup or 4 fl oz (118  mL) fruit juice.  · 1 cup or 8 fl oz (237 mL) milk.  · 1 small or 4 oz (106 g) apple.  · ½ or 2 oz (63 g) of a medium banana.  · 1 cup or 5 oz (150 g) strawberries.  · 3 cups or 1 oz (24 g) popped popcorn.  What is an example of carbohydrate counting?  To calculate the number of carbohydrates in this sample meal, follow the steps shown below.  Sample meal  · 3 oz (85 g) chicken breast.  · ? cup or 4 oz (106 g) brown rice.  · ½ cup or 3 oz (85 g) corn.  · 1 cup or 8 fl oz (237 mL) milk.  · 1 cup or 5 oz (150 g) strawberries with sugar-free whipped topping.  Carbohydrate calculation  1. Identify the foods that contain carbohydrates:  ? Rice.  ? Corn.  ? Milk.  ? Strawberries.  2. Calculate how many servings you have of each food:  ? 2 servings rice.  ? 1 serving corn.  ? 1 serving milk.  ? 1 serving strawberries.  3. Multiply each number of servings by 15 g:  ? 2 servings rice x 15 g = 30 g.  ? 1 serving corn x 15 g = 15 g.  ? 1 serving milk x 15 g = 15 g.  ? 1 serving strawberries x 15 g = 15 g.  4. Add together all of the amounts to find the total grams of carbohydrates eaten:  ? 30 g + 15 g + 15 g + 15 g = 75 g of carbohydrates total.  What are tips for following this plan?  Shopping  · Develop a meal plan and then make a shopping list.  · Buy fresh and frozen vegetables, fresh and frozen fruit, dairy, eggs, beans, lentils, and whole grains.  · Look at food labels. Choose foods that have more fiber and less sugar.  · Avoid processed foods and foods with added sugars.  Meal planning  · Aim to have the same amount of carbohydrates at each meal and for each snack time.  · Plan to have regular, balanced meals and snacks.  Where to find more information  · American Diabetes Association: www.diabetes.org  · Centers for Disease Control and Prevention: www.cdc.gov  Summary  · Carbohydrate counting is a method of keeping track of how many carbohydrates you eat.  · Eating carbohydrates naturally increases the amount of sugar  (glucose) in the blood.  · Counting how many carbohydrates you eat improves your blood glucose control, which helps you manage your diabetes.  · A dietitian can help you make a meal plan and calculate how many carbohydrates you should have at each meal and snack.  This information is not intended to replace advice given to you by your health care provider. Make sure you discuss any questions you have with your health care provider.  Document Revised: 12/17/2020 Document Reviewed: 12/18/2020  [employer] Patient Education © 2021 [employer]    Exercising to Stay Healthy  To become healthy and stay healthy, it is recommended that you do moderate-intensity and vigorous-intensity exercise. You can tell that you are exercising at a moderate intensity if your heart starts beating faster and you start breathing faster but can still hold a conversation. You can tell that you are exercising at a vigorous intensity if you are breathing much harder and faster and cannot hold a conversation while exercising.  Exercising regularly is important. It has many health benefits, such as:  · Improving overall fitness, flexibility, and endurance.  · Increasing bone density.  · Helping with weight control.  · Decreasing body fat.  · Increasing muscle strength.  · Reducing stress and tension.  · Improving overall health.  How often should I exercise?  Choose an activity that you enjoy, and set realistic goals. Your health care provider can help you make an activity plan that works for you.  Exercise regularly as told by your health care provider. This may include:  · Doing strength training two times a week, such as:  ? Lifting weights.  ? Using resistance bands.  ? Push-ups.  ? Sit-ups.  ? Yoga.  · Doing a certain intensity of exercise for a given amount of time. Choose from these options:  ? A total of 150 minutes of moderate-intensity exercise every week.  ? A total of 75 minutes of vigorous-intensity exercise every week.  ? A mix of  moderate-intensity and vigorous-intensity exercise every week.  Children, pregnant women, people who have not exercised regularly, people who are overweight, and older adults may need to talk with a health care provider about what activities are safe to do. If you have a medical condition, be sure to talk with your health care provider before you start a new exercise program.  What are some exercise ideas?  Moderate-intensity exercise ideas include:  · Walking 1 mile (1.6 km) in about 15 minutes.  · Biking.  · Hiking.  · Golfing.  · Dancing.  · Water aerobics.  Vigorous-intensity exercise ideas include:  · Walking 4.5 miles (7.2 km) or more in about 1 hour.  · Jogging or running 5 miles (8 km) in about 1 hour.  · Biking 10 miles (16.1 km) or more in about 1 hour.  · Lap swimming.  · Roller-skating or in-line skating.  · Cross-country skiing.  · Vigorous competitive sports, such as football, basketball, and soccer.  · Jumping rope.  · Aerobic dancing.  What are some everyday activities that can help me to get exercise?  · Yard work, such as:  ? Pushing a .  ? Raking and bagging leaves.  · Washing your car.  · Pushing a stroller.  · Shoveling snow.  · Gardening.  · Washing windows or floors.  How can I be more active in my day-to-day activities?  · Use stairs instead of an elevator.  · Take a walk during your lunch break.  · If you drive, park your car farther away from your work or school.  · If you take public transportation, get off one stop early and walk the rest of the way.  · Stand up or walk around during all of your indoor phone calls.  · Get up, stretch, and walk around every 30 minutes throughout the day.  · Enjoy exercise with a friend. Support to continue exercising will help you keep a regular routine of activity.  What guidelines can I follow while exercising?  · Before you start a new exercise program, talk with your health care provider.  · Do not exercise so much that you hurt yourself,  feel dizzy, or get very short of breath.  · Wear comfortable clothes and wear shoes with good support.  · Drink plenty of water while you exercise to prevent dehydration or heat stroke.  · Work out until your breathing and your heartbeat get faster.  Where to find more information  · [place] Department of Health and Human Services: www.hhs.gov  · Centers for Disease Control and Prevention (CDC): www.cdc.gov  Summary  · Exercising regularly is important. It will improve your overall fitness, flexibility, and endurance.  · Regular exercise also will improve your overall health. It can help you control your weight, reduce stress, and improve your bone density.  · Do not exercise so much that you hurt yourself, feel dizzy, or get very short of breath.  · Before you start a new exercise program, talk with your health care provider.  This information is not intended to replace advice given to you by your health care provider. Make sure you discuss any questions you have with your health care provider.  Document Revised: 11/30/2018 Document Reviewed: 11/08/2018  [employer] Patient Education © 2021 [employer]

## 2021-08-25 PROBLEM — D24.2 FIBROADENOMA OF LEFT BREAST: Status: ACTIVE | Noted: 2021-08-25

## 2021-10-05 PROBLEM — L50.9 HIVES: Status: ACTIVE | Noted: 2021-10-05

## 2022-06-08 NOTE — PROGRESS NOTES
Subjective   Chief Complaint   Patient presents with   • Annual Exam     Well woman gyn exam     Kym Mckenna is a 31 y.o. year old  presenting to be seen for her annual exam. Last pap 2019 normal cytology with negative hpv cotest, previous pap 2018 lsil. She is concerned for possible vaginal infection today. She reports a vaginal odor.   She also reports skin irritation on arms. She states her PCP told her it was maybe a yeast infection and she was sent to allergist. She was given steriods which helped however after discontinuation of meds symptoms returned.     SEXUAL Hx:  She is currently sexually active.  In the past year there there has been NO new sexual partners.    Condoms are never used.  She would not like to be screened for STD's at today's exam.  Current birth control method: tubal ligation.  She is happy with her current method of contraception and does not want to discuss alternative methods of contraception.  MENSTRUAL Hx:  Patient's last menstrual period was 2022 (approximate).  In the past 6 months her cycles have been regular, predictable and occur monthly.  Her menstrual flow is typically normal.   Each month on average there are roughly 1 day(s) of very heavy flow.    Intermenstrual bleeding is absent.    Post-coital bleeding is absent.  Dysmenorrhea: is not affecting her activities of daily living  PMS: is not affecting her activities of daily living  Her cycles are not a source of concern for her that she wishes to discuss today.  HEALTH Hx:  She exercises regularly: no (but is planning to start exercising more ).  She wears her seat belt: yes.  She has concerns about domestic violence: no.  OTHER THINGS SHE WANTS TO DISCUSS TODAY:  Nothing else    The following portions of the patient's history were reviewed and updated as appropriate:problem list, current medications, allergies, past family history, past medical history, past social history and past surgical  "history.    Social History    Tobacco Use      Smoking status: Never Smoker      Smokeless tobacco: Never Used    Review of Systems  Constitutional POS: nothing reported    NEG: anorexia or night sweats   Genitourinary POS: nothing reported    NEG: dysuria or hematuria      Gastointestinal POS: nothing reported    NEG: bloating, change in bowel habits, melena or reflux symptoms   Integument POS: see HPI    NEG: moles that are changing in size, shape, color   Breast POS: nothing reported    NEG: persistent breast lump, skin dimpling or nipple discharge known fibroadenoma in left breast.         Objective   /80 (BP Location: Right arm, Patient Position: Sitting, Cuff Size: Adult)   Ht 157.5 cm (62\")   Wt 93.4 kg (206 lb)   LMP 05/04/2022 (Approximate)   Breastfeeding No   BMI 37.68 kg/m²     General:  well developed; well nourished  no acute distress  obese - Body mass index is 37.68 kg/m².   Skin:  No suspicious lesions seen bilateral areas of skin discoloration noted on bilateral arms,  Plaque like area noted of skin discoloration some rash like lesions noted   Thyroid: normal to inspection and palpation   Breasts:  Examined in supine position  Symmetric without masses or skin dimpling  Nipples normal without inversion, lesions or discharge  There are no palpable axillary nodes  dense breast tissue noted bilaterally.    Abdomen: soft, non-tender; no masses  no umbilical or inguinal hernias are present  no hepato-splenomegaly   Pelvis: Clinical staff was present for exam  :  urethral meatus normal;  Vaginal:  normal pink mucosa without prolapse or lesions.   Wet prep collected, whiff neg no clue cells, lack of lactobacillus noted   Cervix:  normal appearance. blood is seen coming from external cervical os;  Uterus:  normal size, shape and consistency.  Adnexa:  non palpable bilaterally.  Rectal:  digital rectal exam not performed; anus visually normal appearing.  Exam limited by patient body habitus      "   Assessment   1. Well woman gynecological exam  2. Skin irritation  3. Vaginal odor     Plan     Pap was done today.  If she does not receive the results of the Pap within 2 weeks  time, she was instructed to call to find out the results.  I explained to Kym that because she is being seen in follow-up of a previously abnormal Pap smear, her next Pap needs to be performed in 4-6 months.  She will need to be seen roughly every 6 months until 3 consecutive normal Paps have been obtained.  At that point, she can return to routine screening intervals.  Her vaccine record was reviewed and updated.  1. I discussed with Kym that she may be behind on needed vaccinations for COVID booster.  She may be able to obtain these vaccinations at her local pharmacy OR speak about obtaining them with her primary care.  If she does obtain her vaccines, I have asked Kym to let us know the date each vaccine was obtained so that her medical record could be updated in our system.  2. Will refer to dermatology for further evaluation of skin irritation, rx for kenalog ointment sent to pharmacy  3. Wet prep wnl except decreased lactobacillus, start probiotic, patient to call for rx for flagyl if symptoms worsen   4. The importance of keeping all planned follow-up and taking all medications as prescribed was emphasized.  5. Follow up for annual exam 1 year    No orders of the defined types were placed in this encounter.         This note was electronically signed.    Charisse Bueno, KURT  June 14, 2022

## 2022-06-14 ENCOUNTER — OFFICE VISIT (OUTPATIENT)
Dept: OBSTETRICS AND GYNECOLOGY | Facility: CLINIC | Age: 32
End: 2022-06-14

## 2022-06-14 VITALS
BODY MASS INDEX: 37.91 KG/M2 | HEIGHT: 62 IN | DIASTOLIC BLOOD PRESSURE: 80 MMHG | SYSTOLIC BLOOD PRESSURE: 124 MMHG | WEIGHT: 206 LBS

## 2022-06-14 DIAGNOSIS — Z01.419 ENCOUNTER FOR GYNECOLOGICAL EXAMINATION WITHOUT ABNORMAL FINDING: Primary | ICD-10-CM

## 2022-06-14 DIAGNOSIS — R23.8 SKIN IRRITATION: ICD-10-CM

## 2022-06-14 PROCEDURE — 99385 PREV VISIT NEW AGE 18-39: CPT | Performed by: NURSE PRACTITIONER

## 2022-06-14 RX ORDER — TRIAMCINOLONE ACETONIDE 5 MG/G
1 CREAM TOPICAL 2 TIMES DAILY
Qty: 17 G | Refills: 0 | Status: SHIPPED | OUTPATIENT
Start: 2022-06-14

## 2022-06-16 LAB — REF LAB TEST METHOD: NORMAL

## 2022-06-23 DIAGNOSIS — N89.8 VAGINAL IRRITATION: Primary | ICD-10-CM

## 2022-06-23 RX ORDER — METRONIDAZOLE 500 MG/1
500 TABLET ORAL 2 TIMES DAILY
Qty: 14 TABLET | Refills: 0 | Status: SHIPPED | OUTPATIENT
Start: 2022-06-23 | End: 2022-06-30

## 2023-01-03 ENCOUNTER — TELEPHONE (OUTPATIENT)
Dept: OBSTETRICS AND GYNECOLOGY | Facility: CLINIC | Age: 33
End: 2023-01-03
Payer: COMMERCIAL

## 2023-01-03 RX ORDER — METRONIDAZOLE 500 MG/1
500 TABLET ORAL 2 TIMES DAILY
Qty: 14 TABLET | Refills: 0 | Status: SHIPPED | OUTPATIENT
Start: 2023-01-03 | End: 2023-01-10

## 2023-01-03 NOTE — TELEPHONE ENCOUNTER
\"Please notify patient prescription for Flagyl was sent into pharmacy on file.  If no improvement of symptoms she will need an appointment for further work-up.  Notify patient of need to avoid alcohol for 24 to 48 hours after last dose of Flagyl or severe nausea vomiting will occur.   Charisse Beuno, KURT\"    Called and informed patient as noted above, she verified understanding and was appreciative.

## 2023-01-03 NOTE — TELEPHONE ENCOUNTER
Called and spoke with patient.  She states that symptoms started ~2 weeks ago and have stayed stagnant since appearance.  She states she is having an abnormal odor, slight abnormal discharge, no vaginal itching/irritation/redness/swelling, no urinary changes.  She does state that she did use a new soap product a bit before the symptoms started and is unsure of whether or not that could have caused it.  Patient states that symptoms seem very consistent with past instances of BV for her.  She would like to know if something can be called in for her to [facility] on [facility].

## 2023-01-30 NOTE — TELEPHONE ENCOUNTER
Kym called wanting to schedule her c/section for  04/05/19, she signed her sterilization consent on 02/05/19.  Please place order if you are in agreement.   Refill Request         Last Seen: Last Seen Department: 1/27/2023  Last Seen by PCP: 1/27/2023    Last Written: 11/10/2022        Next Appointment:   Future Appointments   Date Time Provider Queta Laya   3/29/2023  9:00 AM MD COLLEEN Gee [facility]   5/1/2023  8:30 AM Erica Stafford APRN - CNP [place] [facility]           Requested Prescriptions     Pending Prescriptions Disp Refills    traZODone (DESYREL) 100 MG tablet 90 tablet 0     Sig: Take 1 tablet by mouth nightly

## 2023-04-28 ENCOUNTER — OFFICE VISIT (OUTPATIENT)
Dept: OBSTETRICS AND GYNECOLOGY | Facility: CLINIC | Age: 33
End: 2023-04-28
Payer: COMMERCIAL

## 2023-04-28 VITALS
WEIGHT: 196.4 LBS | DIASTOLIC BLOOD PRESSURE: 80 MMHG | BODY MASS INDEX: 36.14 KG/M2 | HEIGHT: 62 IN | SYSTOLIC BLOOD PRESSURE: 122 MMHG

## 2023-04-28 DIAGNOSIS — R63.5 ABNORMAL WEIGHT GAIN: ICD-10-CM

## 2023-04-28 DIAGNOSIS — N94.9 VAGINAL BURNING: Primary | ICD-10-CM

## 2023-04-28 DIAGNOSIS — N89.8 VAGINAL DISCHARGE: ICD-10-CM

## 2023-04-28 LAB
BILIRUB BLD-MCNC: NEGATIVE MG/DL
CLARITY, POC: CLEAR
COLOR UR: ABNORMAL
GLUCOSE UR STRIP-MCNC: NEGATIVE MG/DL
KETONES UR QL: NEGATIVE
LEUKOCYTE EST, POC: NEGATIVE
NITRITE UR-MCNC: NEGATIVE MG/ML
PH UR: 6 [PH] (ref 5–8)
PROT UR STRIP-MCNC: ABNORMAL MG/DL
RBC # UR STRIP: NEGATIVE /UL
SP GR UR: 1.02 (ref 1–1.03)
UROBILINOGEN UR QL: ABNORMAL

## 2023-04-28 RX ORDER — METRONIDAZOLE 500 MG/1
500 TABLET ORAL 2 TIMES DAILY
Qty: 14 TABLET | Refills: 0 | Status: SHIPPED | OUTPATIENT
Start: 2023-04-28 | End: 2023-05-05

## 2023-04-28 NOTE — PROGRESS NOTES
Chief Complaint   Patient presents with   • Establish Care       Subjective   HPI  Kym Mckenna is a 32 y.o. female, , who presents for possible BV and weight loss    Her last LMP was Patient's last menstrual period was 2023..  Her periods occur every 25-35 days , lasting 5 days. The flow is moderate.. She reports dysmenorrhea is mild, occurring first 1-2 days of flow. Patient reports problems with: vaginal discharge, burning, and odor.  Partner Status: Marital Status: engaged.  New Partners since last visit: no.  Desires STD Screening: no.    Patient reports she does not exercise regularly. We discussed the importance of regular exercise and calorie restriction.     Additional OB/GYN History   Current contraception: contraceptive methods: Tubal ligation  Desires to: do not start contraception  Last Pap : 2022  Last Completed Pap Smear          PAP SMEAR (Every 3 Years) Next due on 2022  LIQUID-BASED PAP SMEAR, [facility] ([facility])    2022  SCANNED - PAP SMEAR    2019  Pap IG, HPV-hr              History of abnormal Pap smear: yes - colposcopy WNL  Last mammogram:   Last Completed Mammogram     This patient has no relevant Health Maintenance data.        Tobacco Usage?: No   OB History        5    Para   2    Term   2       0    AB   3    Living   2       SAB   1    IAB   2    Ectopic   0    Molar   0    Multiple   0    Live Births   2          Obstetric Comments   G1: Cardale, No GDM/HTN  FOB #1: Pregnancy #1; #3  FOB #2: Pregnancy #2               Health Maintenance   Topic Date Due   • ANNUAL PHYSICAL  Never done   • COVID-19 Vaccine (3 - Booster for Pfizer series) 10/23/2021   • Annual Gynecologic Pelvic and Breast Exam  06/15/2023   • INFLUENZA VACCINE  2023   • PAP SMEAR  2025   • TDAP/TD VACCINES (2 - Td or Tdap) 2029   • HEPATITIS C SCREENING  Completed   • Pneumococcal Vaccine 0-64  Aged Out  "      The additional following portions of the patient's history were reviewed and updated as appropriate: allergies, current medications, past family history, past medical history, past social history and past surgical history.    Review of Systems   Constitutional: Positive for unexpected weight gain. Negative for activity change, appetite change, fatigue, fever and unexpected weight loss.   HENT: Negative.    Eyes: Negative.    Respiratory: Negative.    Cardiovascular: Negative.    Gastrointestinal: Negative.    Endocrine: Negative.    Genitourinary: Positive for vaginal discharge. Negative for breast discharge, breast lump, breast pain, menstrual problem and pelvic pain.   Musculoskeletal: Negative.    Skin: Negative.    Allergic/Immunologic: Negative.    Neurological: Negative.    Hematological: Negative.    Psychiatric/Behavioral: Negative.        I have reviewed and agree with the HPI, ROS, and historical information as entered above. Delfino Villegas MD    Objective   /80   Ht 157.5 cm (62\")   Wt 89.1 kg (196 lb 6.4 oz)   LMP 04/21/2023   BMI 35.92 kg/m²     Physical Exam  Vitals reviewed.   Constitutional:       Appearance: Normal appearance. She is obese.   HENT:      Head: Normocephalic and atraumatic.   Cardiovascular:      Rate and Rhythm: Normal rate and regular rhythm.   Pulmonary:      Effort: Pulmonary effort is normal.      Breath sounds: Normal breath sounds.   Abdominal:      General: Abdomen is flat. Bowel sounds are normal.      Palpations: Abdomen is soft.   Skin:     General: Skin is warm and dry.   Neurological:      Mental Status: She is alert and oriented to person, place, and time.   Psychiatric:         Mood and Affect: Mood normal.         Behavior: Behavior normal.         Assessment & Plan     Assessment     Problem List Items Addressed This Visit    None  Visit Diagnoses     Vaginal burning    -  Primary    Relevant Orders    POC Urinalysis Dipstick (Completed)    " Abnormal weight gain        Relevant Orders    CBC (No Diff)    Hemoglobin A1c    TSH    Prolactin    T4, Free    Comprehensive Metabolic Panel    Vaginal discharge              1. BV  2. Abnormal weight gain    Plan     Return in about 4 weeks (around 5/26/2023) for visit.  1. Will treat suspected BV with flagyl. Will swab with cx if no relief.   2. Will draw labs today due to abnormal weight gain and inability to lose weight. Will discuss in 1 month and consider medical management.       Delfino Villegas MD  04/28/2023

## 2023-04-29 LAB
ALBUMIN SERPL-MCNC: 4.4 G/DL (ref 3.5–5.2)
ALBUMIN/GLOB SERPL: 1.4 G/DL
ALP SERPL-CCNC: 73 U/L (ref 39–117)
ALT SERPL-CCNC: 19 U/L (ref 1–33)
AST SERPL-CCNC: 17 U/L (ref 1–32)
BILIRUB SERPL-MCNC: 0.3 MG/DL (ref 0–1.2)
BUN SERPL-MCNC: 8 MG/DL (ref 6–20)
BUN/CREAT SERPL: 12.5 (ref 7–25)
CALCIUM SERPL-MCNC: 9.5 MG/DL (ref 8.6–10.5)
CHLORIDE SERPL-SCNC: 102 MMOL/L (ref 98–107)
CO2 SERPL-SCNC: 26.5 MMOL/L (ref 22–29)
CREAT SERPL-MCNC: 0.64 MG/DL (ref 0.57–1)
EGFRCR SERPLBLD CKD-EPI 2021: 119.1 ML/MIN/1.73
ERYTHROCYTE [DISTWIDTH] IN BLOOD BY AUTOMATED COUNT: 12.4 % (ref 12.3–15.4)
GLOBULIN SER CALC-MCNC: 3.2 GM/DL
GLUCOSE SERPL-MCNC: 72 MG/DL (ref 65–99)
HBA1C MFR BLD: 5 % (ref 4.8–5.6)
HCT VFR BLD AUTO: 39.2 % (ref 34–46.6)
HGB BLD-MCNC: 13.5 G/DL (ref 12–15.9)
MCH RBC QN AUTO: 29.7 PG (ref 26.6–33)
MCHC RBC AUTO-ENTMCNC: 34.4 G/DL (ref 31.5–35.7)
MCV RBC AUTO: 86.3 FL (ref 79–97)
PLATELET # BLD AUTO: 285 10*3/MM3 (ref 140–450)
POTASSIUM SERPL-SCNC: 4.4 MMOL/L (ref 3.5–5.2)
PROLACTIN SERPL-MCNC: 9.1 NG/ML (ref 4.8–23.3)
PROT SERPL-MCNC: 7.6 G/DL (ref 6–8.5)
RBC # BLD AUTO: 4.54 10*6/MM3 (ref 3.77–5.28)
SODIUM SERPL-SCNC: 139 MMOL/L (ref 136–145)
T4 FREE SERPL-MCNC: 0.92 NG/DL (ref 0.93–1.7)
TSH SERPL DL<=0.005 MIU/L-ACNC: 0.77 UIU/ML (ref 0.27–4.2)
WBC # BLD AUTO: 7.46 10*3/MM3 (ref 3.4–10.8)

## 2023-05-02 RX ORDER — LEVOTHYROXINE SODIUM 0.05 MG/1
50 TABLET ORAL DAILY
Qty: 30 TABLET | Refills: 5 | Status: SHIPPED | OUTPATIENT
Start: 2023-05-02 | End: 2023-06-01

## 2024-05-29 DIAGNOSIS — N92.6 IRREGULAR PERIODS: Primary | ICD-10-CM

## 2024-05-30 ENCOUNTER — OFFICE VISIT (OUTPATIENT)
Dept: OBSTETRICS AND GYNECOLOGY | Facility: CLINIC | Age: 34
End: 2024-05-30
Payer: COMMERCIAL

## 2024-05-30 VITALS
SYSTOLIC BLOOD PRESSURE: 110 MMHG | BODY MASS INDEX: 39.2 KG/M2 | WEIGHT: 213 LBS | HEIGHT: 62 IN | DIASTOLIC BLOOD PRESSURE: 74 MMHG

## 2024-05-30 DIAGNOSIS — E66.9 OBESITY (BMI 30-39.9): ICD-10-CM

## 2024-05-30 DIAGNOSIS — Z01.419 WOMEN'S ANNUAL ROUTINE GYNECOLOGICAL EXAMINATION: Primary | ICD-10-CM

## 2024-05-30 DIAGNOSIS — N92.6 IRREGULAR PERIODS: ICD-10-CM

## 2024-05-30 RX ORDER — ACETAMINOPHEN AND CODEINE PHOSPHATE 120; 12 MG/5ML; MG/5ML
1 SOLUTION ORAL DAILY
Qty: 84 TABLET | Refills: 3 | Status: SHIPPED | OUTPATIENT
Start: 2024-05-30

## 2024-05-30 NOTE — PROGRESS NOTES
Gynecologic Annual Exam Note        Menstrual Problem and Gynecologic Exam        Subjective     HPI  Kym Mkcenna is a 33 y.o.  female who presents for annual well woman exam as a established patient. There were no changes to her medical or surgical history since her last visit.. Patient's last menstrual period was 2024 (approximate).  She reports that her periods are irregular and has missed periods for a year before.  She states that she started having them again last December and had monthly until April then no period for May.   Since her period returned they last up to 2 weeks.The flow is heavy the second week with large clots. She denies dysmenorrhea. Marital Status: .  She is sexually active. She has not had new partners.. STD testing recommendations have been explained to the patient and she does not desire STD testing.    The patient would like to discuss the following complaints today: irregular periods.    Before she had the bilateral salpingectomy, an IUD fell out; she declines another IUD.     She was taking thyroid medication but stopped.  She was worried it would give her cancer.  Her last blood draw was a year ago.  She declines Rx with estrogen due to fear of cancer.    Additional OB/GYN History   contraceptive methods: Tubal ligation  Desires to: start contraception  Thromboembolic Disease: none  History of migraines: no  Age of menarche: 10    History of STD: no    Last Pap : 2022. Results: negative. HPV: negative.   Last Completed Pap Smear            PAP SMEAR (Every 3 Years) Next due on 2022  LIQUID-BASED PAP SMEAR, [facility] ([facility])    2022  SCANNED - PAP SMEAR    2019  Pap IG, HPV-hr                     History of abnormal Pap smear: yes - 2018 with normal f/u  Gardasil status: missed  Family history of uterine, colon, breast, or ovarian cancer: no  Performs monthly Self-Breast Exam: yes  Exercises  Regularly:sometimes  Feelings of Anxiety or Depression: no  Tobacco Usage?: No       Current Outpatient Medications:     fluticasone (FLONASE) 50 MCG/ACT nasal spray, 1 spray into the nostril(s) as directed by provider Daily. (Patient not taking: Reported on 2024), Disp: 11.1 mL, Rfl: 0    norethindrone (MICRONOR) 0.35 MG tablet, Take 1 tablet by mouth Daily., Disp: 84 tablet, Rfl: 3     Patient denies the need for medication refills today.    OB History          5    Para   2    Term   2       0    AB   3    Living   2         SAB   1    IAB   2    Ectopic   0    Molar   0    Multiple   0    Live Births   2          Obstetric Comments   G1: Cardale, No GDM/HTN  FOB #1: Pregnancy #1; #3  FOB #2: Pregnancy #2                 Health Maintenance   Topic Date Due    ANNUAL PHYSICAL  Never done    Annual Gynecologic Pelvic and Breast Exam  06/15/2023    COVID-19 Vaccine (3 - 2023-24 season) 2023    BMI FOLLOWUP  2024    INFLUENZA VACCINE  2024    PAP SMEAR  2025    TDAP/TD VACCINES (2 - Td or Tdap) 2029    HEPATITIS C SCREENING  Completed    Pneumococcal Vaccine 0-64  Aged Out       Past Medical History:   Diagnosis Date    Abnormal Pap smear of cervix 2018    colpo WNL    Allergic 2021    Skin allergies , cherries    History of cervical dysplasia     Obesity 2021    Overweight    Ovarian cyst         Past Surgical History:   Procedure Laterality Date    BREAST BIOPSY Left     WNL    COLPOSCOPY  2018    no biopsies    COLPOSCOPY  2018    WNL    INDUCED   2009    EAB    INDUCED   2009    EAB    TUBAL ABDOMINAL LIGATION         The additional following portions of the patient's history were reviewed and updated as appropriate: allergies, current medications, past family history, past medical history, past social history, past surgical history, and problem list.    Review of Systems   Constitutional: Negative.    HENT: Negative.    "  Eyes: Negative.    Respiratory: Negative.     Cardiovascular: Negative.    Gastrointestinal: Negative.    Endocrine: Negative.    Genitourinary:  Positive for menstrual problem.   Musculoskeletal: Negative.    Skin: Negative.    Allergic/Immunologic: Negative.    Neurological: Negative.    Hematological: Negative.    Psychiatric/Behavioral: Negative.           I have reviewed and agree with the HPI, ROS, and historical information as entered above. Sally Quarles Laureano, APRN          Objective   /74   Ht 157.5 cm (62\")   Wt 96.6 kg (213 lb)   LMP 04/01/2024 (Approximate)   BMI 38.96 kg/m²     Physical Exam  Vitals and nursing note reviewed. Exam conducted with a chaperone present.   Constitutional:       General: She is not in acute distress.     Appearance: Normal appearance. She is well-developed. She is obese. She is not ill-appearing.   Neck:      Thyroid: No thyroid mass or thyromegaly.   Pulmonary:      Effort: Pulmonary effort is normal. No respiratory distress or retractions.   Chest:      Chest wall: No mass.   Breasts:     Right: Normal. No mass, nipple discharge, skin change or tenderness.      Left: Normal. No mass, nipple discharge, skin change or tenderness.   Abdominal:      General: There is no distension.      Palpations: Abdomen is soft. Abdomen is not rigid. There is no mass.      Tenderness: There is no abdominal tenderness. There is no guarding or rebound.      Hernia: No hernia is present. There is no hernia in the left inguinal area.   Genitourinary:     General: Normal vulva.      Labia:         Right: No rash, tenderness or lesion.         Left: No rash, tenderness or lesion.       Vagina: Normal. No vaginal discharge or lesions.      Cervix: Normal.      Uterus: Normal. Not enlarged, not fixed and not tender.       Adnexa: Right adnexa normal and left adnexa normal.        Right: No mass or tenderness.          Left: No mass or tenderness.        Rectum: No external hemorrhoid. "   Musculoskeletal:      Cervical back: No muscular tenderness.   Skin:     General: Skin is warm and dry.   Neurological:      Mental Status: She is alert and oriented to person, place, and time.   Psychiatric:         Mood and Affect: Mood normal.         Behavior: Behavior normal.            Assessment and Plan    Problem List Items Addressed This Visit          Endocrine and Metabolic    Obesity (BMI 30-39.9)    Relevant Orders    Comprehensive Metabolic Panel    CBC & Differential    Hemoglobin A1c    Vitamin D,25-Hydroxy    TSH    T4, Free    Prolactin    Testosterone    17-Hydroxyprogesterone    DHEA-Sulfate     Other Visit Diagnoses       Women's annual routine gynecological examination    -  Primary    Irregular periods        Relevant Orders    Comprehensive Metabolic Panel    CBC & Differential    Hemoglobin A1c    Vitamin D,25-Hydroxy    TSH    T4, Free    Prolactin    Testosterone    17-Hydroxyprogesterone    DHEA-Sulfate            GYN annual well woman exam.   Reviewed pap guidelines.   Encouraged use of condoms for STD prevention.  Reviewed monthly self breast exams.  Instructed to call with lumps, pain, or breast discharge.    Reviewed BMI and weight loss as preventative health measures.   Reviewed exercise as a preventative health measures.   Reccommended Flu Vaccine in Fall of each year.  RTC in 1 year or PRN with problems  Return in about 1 year (around 5/30/2025), or if symptoms worsen or fail to improve, for Annual physical.  D/w pt US today is wnl.  Rev importance of a period at least every 3 months.  She v/u and wants a POP.   PCP list given; she will get established for follow up    Sally Tinsley, APRN  05/30/2024

## 2024-05-31 LAB
25(OH)D3+25(OH)D2 SERPL-MCNC: 10.4 NG/ML (ref 30–100)
ALBUMIN SERPL-MCNC: 4.2 G/DL (ref 3.9–4.9)
ALBUMIN/GLOB SERPL: 1.3 {RATIO} (ref 1.2–2.2)
ALP SERPL-CCNC: 77 IU/L (ref 44–121)
ALT SERPL-CCNC: 17 IU/L (ref 0–32)
AST SERPL-CCNC: 19 IU/L (ref 0–40)
BASOPHILS # BLD AUTO: 0.1 X10E3/UL (ref 0–0.2)
BASOPHILS NFR BLD AUTO: 1 %
BILIRUB SERPL-MCNC: 0.2 MG/DL (ref 0–1.2)
BUN SERPL-MCNC: 8 MG/DL (ref 6–20)
BUN/CREAT SERPL: 11 (ref 9–23)
CALCIUM SERPL-MCNC: 9.2 MG/DL (ref 8.7–10.2)
CHLORIDE SERPL-SCNC: 105 MMOL/L (ref 96–106)
CO2 SERPL-SCNC: 22 MMOL/L (ref 20–29)
CREAT SERPL-MCNC: 0.76 MG/DL (ref 0.57–1)
DHEA-S SERPL-MCNC: 145 UG/DL (ref 57.3–279.2)
EGFRCR SERPLBLD CKD-EPI 2021: 105 ML/MIN/1.73
EOSINOPHIL # BLD AUTO: 0.7 X10E3/UL (ref 0–0.4)
EOSINOPHIL NFR BLD AUTO: 9 %
ERYTHROCYTE [DISTWIDTH] IN BLOOD BY AUTOMATED COUNT: 13 % (ref 11.7–15.4)
GLOBULIN SER CALC-MCNC: 3.2 G/DL (ref 1.5–4.5)
GLUCOSE SERPL-MCNC: 84 MG/DL (ref 70–99)
HBA1C MFR BLD: 5.5 % (ref 4.8–5.6)
HCT VFR BLD AUTO: 41.1 % (ref 34–46.6)
HGB BLD-MCNC: 13.5 G/DL (ref 11.1–15.9)
IMM GRANULOCYTES # BLD AUTO: 0 X10E3/UL (ref 0–0.1)
IMM GRANULOCYTES NFR BLD AUTO: 0 %
LYMPHOCYTES # BLD AUTO: 3.1 X10E3/UL (ref 0.7–3.1)
LYMPHOCYTES NFR BLD AUTO: 41 %
MCH RBC QN AUTO: 28.7 PG (ref 26.6–33)
MCHC RBC AUTO-ENTMCNC: 32.8 G/DL (ref 31.5–35.7)
MCV RBC AUTO: 87 FL (ref 79–97)
MONOCYTES # BLD AUTO: 0.9 X10E3/UL (ref 0.1–0.9)
MONOCYTES NFR BLD AUTO: 12 %
NEUTROPHILS # BLD AUTO: 2.8 X10E3/UL (ref 1.4–7)
NEUTROPHILS NFR BLD AUTO: 37 %
PLATELET # BLD AUTO: 239 X10E3/UL (ref 150–450)
POTASSIUM SERPL-SCNC: 4.2 MMOL/L (ref 3.5–5.2)
PROLACTIN SERPL-MCNC: 10.7 NG/ML (ref 4.8–33.4)
PROT SERPL-MCNC: 7.4 G/DL (ref 6–8.5)
RBC # BLD AUTO: 4.71 X10E6/UL (ref 3.77–5.28)
SODIUM SERPL-SCNC: 140 MMOL/L (ref 134–144)
T4 FREE SERPL-MCNC: 1.08 NG/DL (ref 0.82–1.77)
TESTOST SERPL-MCNC: 43 NG/DL (ref 8–60)
TSH SERPL DL<=0.005 MIU/L-ACNC: 1.35 UIU/ML (ref 0.45–4.5)
WBC # BLD AUTO: 7.6 X10E3/UL (ref 3.4–10.8)

## 2024-06-05 LAB — 17OHP SERPL-MCNC: 19 NG/DL

## 2024-06-10 ENCOUNTER — TELEPHONE (OUTPATIENT)
Dept: OBSTETRICS AND GYNECOLOGY | Facility: CLINIC | Age: 34
End: 2024-06-10
Payer: COMMERCIAL

## 2024-06-10 NOTE — TELEPHONE ENCOUNTER
Patient of Dr. Villegas; LOV 05/30/24 with KURT Bender.   REED Tinsley was out of the office last week and has not reviewed all of patient's results yet.   Attempted to return patient's call. Left voice message to call us back.

## 2024-06-10 NOTE — TELEPHONE ENCOUNTER
Returned patient's call.   Informed her that KURT Bender has been out of the office; will return tomorrow and should review labs then. She v/u.

## 2024-06-11 RX ORDER — ERGOCALCIFEROL 1.25 MG/1
CAPSULE ORAL
Qty: 8 CAPSULE | Refills: 0 | Status: SHIPPED | OUTPATIENT
Start: 2024-06-11